# Patient Record
Sex: FEMALE | Race: WHITE | NOT HISPANIC OR LATINO | Employment: OTHER | URBAN - NONMETROPOLITAN AREA
[De-identification: names, ages, dates, MRNs, and addresses within clinical notes are randomized per-mention and may not be internally consistent; named-entity substitution may affect disease eponyms.]

---

## 2017-09-01 ENCOUNTER — APPOINTMENT (OUTPATIENT)
Dept: PREADMISSION TESTING | Facility: HOSPITAL | Age: 57
End: 2017-09-01

## 2017-09-01 VITALS — WEIGHT: 204 LBS | HEIGHT: 58 IN | BODY MASS INDEX: 42.82 KG/M2

## 2017-09-01 LAB
ALBUMIN SERPL-MCNC: 4.3 G/DL (ref 3.5–5)
ALBUMIN/GLOB SERPL: 1.6 G/DL (ref 1–2)
ALP SERPL-CCNC: 60 U/L (ref 38–126)
ALT SERPL W P-5'-P-CCNC: 38 U/L (ref 13–69)
ANION GAP SERPL CALCULATED.3IONS-SCNC: 13 MMOL/L
AST SERPL-CCNC: 22 U/L (ref 15–46)
BILIRUB SERPL-MCNC: 0.5 MG/DL (ref 0.2–1.3)
BUN BLD-MCNC: 10 MG/DL (ref 7–20)
BUN/CREAT SERPL: 11.1 (ref 7.1–23.5)
CALCIUM SPEC-SCNC: 9.5 MG/DL (ref 8.4–10.2)
CHLORIDE SERPL-SCNC: 107 MMOL/L (ref 98–107)
CO2 SERPL-SCNC: 26 MMOL/L (ref 26–30)
CREAT BLD-MCNC: 0.9 MG/DL (ref 0.6–1.3)
DEPRECATED RDW RBC AUTO: 46.5 FL (ref 37–54)
ERYTHROCYTE [DISTWIDTH] IN BLOOD BY AUTOMATED COUNT: 13.2 % (ref 11.5–14.5)
GFR SERPL CREATININE-BSD FRML MDRD: 65 ML/MIN/1.73
GLOBULIN UR ELPH-MCNC: 2.7 GM/DL
GLUCOSE BLD-MCNC: 130 MG/DL (ref 74–98)
HCT VFR BLD AUTO: 40 % (ref 37–47)
HGB BLD-MCNC: 12.7 G/DL (ref 12–16)
MCH RBC QN AUTO: 30.2 PG (ref 27–31)
MCHC RBC AUTO-ENTMCNC: 31.8 G/DL (ref 30–37)
MCV RBC AUTO: 95 FL (ref 81–99)
PLATELET # BLD AUTO: 216 10*3/MM3 (ref 130–400)
PMV BLD AUTO: 9.9 FL (ref 6–12)
POTASSIUM BLD-SCNC: 4 MMOL/L (ref 3.5–5.1)
PROT SERPL-MCNC: 7 G/DL (ref 6.3–8.2)
RBC # BLD AUTO: 4.21 10*6/MM3 (ref 4.2–5.4)
SODIUM BLD-SCNC: 142 MMOL/L (ref 137–145)
WBC NRBC COR # BLD: 5.17 10*3/MM3 (ref 4.8–10.8)

## 2017-09-01 PROCEDURE — 93005 ELECTROCARDIOGRAM TRACING: CPT | Performed by: ORTHOPAEDIC SURGERY

## 2017-09-01 PROCEDURE — 36415 COLL VENOUS BLD VENIPUNCTURE: CPT

## 2017-09-01 PROCEDURE — 85027 COMPLETE CBC AUTOMATED: CPT | Performed by: ORTHOPAEDIC SURGERY

## 2017-09-01 PROCEDURE — 80053 COMPREHEN METABOLIC PANEL: CPT | Performed by: ORTHOPAEDIC SURGERY

## 2017-09-01 RX ORDER — AMITRIPTYLINE HYDROCHLORIDE 50 MG/1
50 TABLET, FILM COATED ORAL NIGHTLY
COMMUNITY
End: 2021-11-04

## 2017-09-01 RX ORDER — GABAPENTIN 600 MG/1
600 TABLET ORAL 3 TIMES DAILY
COMMUNITY
End: 2021-11-04

## 2017-09-01 RX ORDER — LANOLIN ALCOHOL/MO/W.PET/CERES
1000 CREAM (GRAM) TOPICAL DAILY
COMMUNITY

## 2017-09-01 RX ORDER — TRAZODONE HYDROCHLORIDE 100 MG/1
100 TABLET ORAL NIGHTLY
COMMUNITY
End: 2022-03-17

## 2017-09-01 RX ORDER — OMEPRAZOLE 20 MG/1
20 CAPSULE, DELAYED RELEASE ORAL DAILY
COMMUNITY
End: 2022-03-17

## 2017-09-01 RX ORDER — LEVOTHYROXINE SODIUM 0.05 MG/1
50 TABLET ORAL DAILY
COMMUNITY

## 2017-09-01 RX ORDER — FLUOXETINE HYDROCHLORIDE 40 MG/1
40 CAPSULE ORAL DAILY
COMMUNITY
End: 2022-03-17

## 2017-09-01 RX ORDER — LISINOPRIL 40 MG/1
40 TABLET ORAL DAILY
COMMUNITY

## 2017-09-01 RX ORDER — MELOXICAM 15 MG/1
15 TABLET ORAL
COMMUNITY
End: 2021-11-04

## 2017-09-15 ENCOUNTER — HOSPITAL ENCOUNTER (OUTPATIENT)
Facility: HOSPITAL | Age: 57
Setting detail: HOSPITAL OUTPATIENT SURGERY
Discharge: HOME | End: 2017-09-15
Attending: ORTHOPAEDIC SURGERY | Admitting: ORTHOPAEDIC SURGERY

## 2017-09-15 ENCOUNTER — ANESTHESIA EVENT (OUTPATIENT)
Dept: PERIOP | Facility: HOSPITAL | Age: 57
End: 2017-09-15

## 2017-09-15 ENCOUNTER — ANESTHESIA (OUTPATIENT)
Dept: PERIOP | Facility: HOSPITAL | Age: 57
End: 2017-09-15

## 2017-09-15 VITALS
TEMPERATURE: 98 F | DIASTOLIC BLOOD PRESSURE: 97 MMHG | HEART RATE: 80 BPM | OXYGEN SATURATION: 97 % | RESPIRATION RATE: 18 BRPM | SYSTOLIC BLOOD PRESSURE: 165 MMHG

## 2017-09-15 PROCEDURE — 25010000002 SUCCINYLCHOLINE PER 20 MG: Performed by: NURSE ANESTHETIST, CERTIFIED REGISTERED

## 2017-09-15 PROCEDURE — 25010000002 MORPHINE PER 10 MG: Performed by: ORTHOPAEDIC SURGERY

## 2017-09-15 PROCEDURE — 25010000002 DEXAMETHASONE SODIUM PHOSPHATE 10 MG/ML SOLUTION: Performed by: NURSE ANESTHETIST, CERTIFIED REGISTERED

## 2017-09-15 PROCEDURE — 25010000002 DEXAMETHASONE PER 1 MG: Performed by: NURSE ANESTHETIST, CERTIFIED REGISTERED

## 2017-09-15 PROCEDURE — 25010000002 EPINEPHRINE 1 MG/ML SOLUTION: Performed by: ORTHOPAEDIC SURGERY

## 2017-09-15 PROCEDURE — 25010000002 ONDANSETRON PER 1 MG: Performed by: NURSE ANESTHETIST, CERTIFIED REGISTERED

## 2017-09-15 PROCEDURE — 25010000002 HYDROMORPHONE PER 4 MG

## 2017-09-15 RX ORDER — LIDOCAINE HYDROCHLORIDE 20 MG/ML
INJECTION, SOLUTION INFILTRATION; PERINEURAL
Status: DISCONTINUED
Start: 2017-09-15 | End: 2017-09-15 | Stop reason: HOSPADM

## 2017-09-15 RX ORDER — HYDROCODONE BITARTRATE AND ACETAMINOPHEN 7.5; 325 MG/1; MG/1
1-2 TABLET ORAL EVERY 4 HOURS PRN
Qty: 50 TABLET | Refills: 0 | Status: SHIPPED | OUTPATIENT
Start: 2017-09-15 | End: 2021-11-04

## 2017-09-15 RX ORDER — DEXAMETHASONE SODIUM PHOSPHATE 10 MG/ML
INJECTION, SOLUTION INTRAMUSCULAR; INTRAVENOUS
Status: DISCONTINUED
Start: 2017-09-15 | End: 2017-09-15 | Stop reason: HOSPADM

## 2017-09-15 RX ORDER — BUPIVACAINE HYDROCHLORIDE 2.5 MG/ML
INJECTION, SOLUTION EPIDURAL; INFILTRATION; INTRACAUDAL
Status: DISCONTINUED
Start: 2017-09-15 | End: 2017-09-15 | Stop reason: HOSPADM

## 2017-09-15 RX ORDER — DEXAMETHASONE SODIUM PHOSPHATE 10 MG/ML
INJECTION, SOLUTION INTRAMUSCULAR; INTRAVENOUS AS NEEDED
Status: DISCONTINUED | OUTPATIENT
Start: 2017-09-15 | End: 2017-09-15 | Stop reason: SURG

## 2017-09-15 RX ORDER — BUPIVACAINE HYDROCHLORIDE AND EPINEPHRINE 5; 5 MG/ML; UG/ML
INJECTION, SOLUTION EPIDURAL; INTRACAUDAL; PERINEURAL
Status: DISCONTINUED
Start: 2017-09-15 | End: 2017-09-15 | Stop reason: HOSPADM

## 2017-09-15 RX ORDER — ONDANSETRON 2 MG/ML
INJECTION INTRAMUSCULAR; INTRAVENOUS AS NEEDED
Status: DISCONTINUED | OUTPATIENT
Start: 2017-09-15 | End: 2017-09-15 | Stop reason: SURG

## 2017-09-15 RX ORDER — SUCCINYLCHOLINE CHLORIDE 20 MG/ML
INJECTION INTRAMUSCULAR; INTRAVENOUS AS NEEDED
Status: DISCONTINUED | OUTPATIENT
Start: 2017-09-15 | End: 2017-09-15 | Stop reason: SURG

## 2017-09-15 RX ORDER — BUPIVACAINE HYDROCHLORIDE 2.5 MG/ML
INJECTION, SOLUTION EPIDURAL; INFILTRATION; INTRACAUDAL AS NEEDED
Status: DISCONTINUED | OUTPATIENT
Start: 2017-09-15 | End: 2017-09-15 | Stop reason: SURG

## 2017-09-15 RX ORDER — ONDANSETRON 2 MG/ML
4 INJECTION INTRAMUSCULAR; INTRAVENOUS ONCE
Status: DISCONTINUED | OUTPATIENT
Start: 2017-09-15 | End: 2017-09-15 | Stop reason: HOSPADM

## 2017-09-15 RX ORDER — EPINEPHRINE 1 MG/ML
INJECTION INTRAMUSCULAR; INTRAVENOUS; SUBCUTANEOUS AS NEEDED
Status: DISCONTINUED | OUTPATIENT
Start: 2017-09-15 | End: 2017-09-15 | Stop reason: HOSPADM

## 2017-09-15 RX ORDER — DEXAMETHASONE SODIUM PHOSPHATE 4 MG/ML
INJECTION, SOLUTION INTRA-ARTICULAR; INTRALESIONAL; INTRAMUSCULAR; INTRAVENOUS; SOFT TISSUE AS NEEDED
Status: DISCONTINUED | OUTPATIENT
Start: 2017-09-15 | End: 2017-09-15 | Stop reason: SURG

## 2017-09-15 RX ORDER — SODIUM CHLORIDE 0.9 % (FLUSH) 0.9 %
3 SYRINGE (ML) INJECTION AS NEEDED
Status: DISCONTINUED | OUTPATIENT
Start: 2017-09-15 | End: 2017-09-15 | Stop reason: HOSPADM

## 2017-09-15 RX ORDER — SODIUM CHLORIDE, SODIUM LACTATE, POTASSIUM CHLORIDE, CALCIUM CHLORIDE 600; 310; 30; 20 MG/100ML; MG/100ML; MG/100ML; MG/100ML
1000 INJECTION, SOLUTION INTRAVENOUS CONTINUOUS PRN
Status: DISCONTINUED | OUTPATIENT
Start: 2017-09-15 | End: 2017-09-15 | Stop reason: HOSPADM

## 2017-09-15 RX ORDER — MEPERIDINE HYDROCHLORIDE 50 MG/ML
25 INJECTION INTRAMUSCULAR; INTRAVENOUS; SUBCUTANEOUS ONCE
Status: DISCONTINUED | OUTPATIENT
Start: 2017-09-15 | End: 2017-09-15 | Stop reason: HOSPADM

## 2017-09-15 RX ORDER — MEPERIDINE HYDROCHLORIDE 50 MG/ML
INJECTION INTRAMUSCULAR; INTRAVENOUS; SUBCUTANEOUS AS NEEDED
Status: DISCONTINUED | OUTPATIENT
Start: 2017-09-15 | End: 2017-09-15 | Stop reason: SURG

## 2017-09-15 RX ORDER — BUPIVACAINE HYDROCHLORIDE AND EPINEPHRINE 5; 5 MG/ML; UG/ML
INJECTION, SOLUTION EPIDURAL; INTRACAUDAL; PERINEURAL AS NEEDED
Status: DISCONTINUED | OUTPATIENT
Start: 2017-09-15 | End: 2017-09-15 | Stop reason: HOSPADM

## 2017-09-15 RX ADMIN — DEXAMETHASONE SODIUM PHOSPHATE 4 MG: 4 INJECTION, SOLUTION INTRAMUSCULAR; INTRAVENOUS at 09:35

## 2017-09-15 RX ADMIN — DEXAMETHASONE SODIUM PHOSPHATE 10 MG: 10 INJECTION, SOLUTION INTRAMUSCULAR; INTRAVENOUS at 10:43

## 2017-09-15 RX ADMIN — BUPIVACAINE HYDROCHLORIDE 15 ML: 2.5 INJECTION, SOLUTION EPIDURAL; INFILTRATION; INTRACAUDAL; PERINEURAL at 10:43

## 2017-09-15 RX ADMIN — ONDANSETRON 4 MG: 2 INJECTION INTRAMUSCULAR; INTRAVENOUS at 09:40

## 2017-09-15 RX ADMIN — MEPERIDINE HYDROCHLORIDE 25 MG: 50 INJECTION INTRAMUSCULAR; INTRAVENOUS; SUBCUTANEOUS at 09:28

## 2017-09-15 RX ADMIN — CEFAZOLIN 1 G: 1 INJECTION, POWDER, FOR SOLUTION INTRAMUSCULAR; INTRAVENOUS; PARENTERAL at 09:25

## 2017-09-15 RX ADMIN — HYDROMORPHONE HYDROCHLORIDE 1 MG: 1 INJECTION, SOLUTION INTRAMUSCULAR; INTRAVENOUS; SUBCUTANEOUS at 10:15

## 2017-09-15 RX ADMIN — SODIUM CHLORIDE, POTASSIUM CHLORIDE, SODIUM LACTATE AND CALCIUM CHLORIDE 500 ML: 600; 310; 30; 20 INJECTION, SOLUTION INTRAVENOUS at 07:55

## 2017-09-15 RX ADMIN — MEPERIDINE HYDROCHLORIDE 25 MG: 50 INJECTION INTRAMUSCULAR; INTRAVENOUS; SUBCUTANEOUS at 09:18

## 2017-09-15 RX ADMIN — SUCCINYLCHOLINE CHLORIDE 80 MG: 20 INJECTION, SOLUTION INTRAMUSCULAR; INTRAVENOUS at 09:22

## 2017-09-15 RX ADMIN — Medication 1 MG: at 10:15

## 2017-09-27 ENCOUNTER — HOSPITAL ENCOUNTER (EMERGENCY)
Facility: HOSPITAL | Age: 57
Discharge: HOME | End: 2017-09-27
Attending: EMERGENCY MEDICINE | Admitting: EMERGENCY MEDICINE

## 2017-09-27 VITALS
HEART RATE: 95 BPM | DIASTOLIC BLOOD PRESSURE: 84 MMHG | TEMPERATURE: 98.5 F | HEIGHT: 58 IN | RESPIRATION RATE: 16 BRPM | BODY MASS INDEX: 41.98 KG/M2 | SYSTOLIC BLOOD PRESSURE: 138 MMHG | WEIGHT: 200 LBS | OXYGEN SATURATION: 99 %

## 2017-09-27 DIAGNOSIS — H92.01 EAR DISCOMFORT, RIGHT: Primary | ICD-10-CM

## 2017-09-27 PROCEDURE — 99282 EMERGENCY DEPT VISIT SF MDM: CPT

## 2018-10-29 ENCOUNTER — TRANSCRIBE ORDERS (OUTPATIENT)
Dept: ADMINISTRATIVE | Facility: HOSPITAL | Age: 58
End: 2018-10-29

## 2018-10-29 DIAGNOSIS — Z12.39 SCREENING BREAST EXAMINATION: Primary | ICD-10-CM

## 2018-12-14 ENCOUNTER — HOSPITAL ENCOUNTER (OUTPATIENT)
Dept: MAMMOGRAPHY | Facility: HOSPITAL | Age: 58
Discharge: HOME | End: 2018-12-14

## 2018-12-14 DIAGNOSIS — Z12.39 SCREENING BREAST EXAMINATION: ICD-10-CM

## 2018-12-31 ENCOUNTER — HOSPITAL ENCOUNTER (OUTPATIENT)
Dept: MAMMOGRAPHY | Facility: HOSPITAL | Age: 58
Discharge: HOME | End: 2018-12-31
Admitting: FAMILY MEDICINE

## 2018-12-31 PROCEDURE — 77063 BREAST TOMOSYNTHESIS BI: CPT

## 2018-12-31 PROCEDURE — 77067 SCR MAMMO BI INCL CAD: CPT

## 2020-01-22 ENCOUNTER — HOSPITAL ENCOUNTER (OUTPATIENT)
Dept: ULTRASOUND IMAGING | Facility: HOSPITAL | Age: 60
Discharge: HOME | End: 2020-01-22
Admitting: ORTHOPAEDIC SURGERY

## 2020-01-22 ENCOUNTER — TRANSCRIBE ORDERS (OUTPATIENT)
Dept: ULTRASOUND IMAGING | Facility: HOSPITAL | Age: 60
End: 2020-01-22

## 2020-01-22 DIAGNOSIS — M79.605 LEFT LEG PAIN: Primary | ICD-10-CM

## 2020-01-22 PROCEDURE — 93971 EXTREMITY STUDY: CPT

## 2021-01-19 ENCOUNTER — TRANSCRIBE ORDERS (OUTPATIENT)
Dept: ADMINISTRATIVE | Facility: HOSPITAL | Age: 61
End: 2021-01-19

## 2021-01-19 DIAGNOSIS — Z12.31 VISIT FOR SCREENING MAMMOGRAM: Primary | ICD-10-CM

## 2021-05-06 ENCOUNTER — HOSPITAL ENCOUNTER (OUTPATIENT)
Dept: MAMMOGRAPHY | Facility: HOSPITAL | Age: 61
Discharge: HOME | End: 2021-05-06
Admitting: FAMILY MEDICINE

## 2021-05-06 DIAGNOSIS — Z12.31 VISIT FOR SCREENING MAMMOGRAM: ICD-10-CM

## 2021-05-06 PROCEDURE — 77063 BREAST TOMOSYNTHESIS BI: CPT

## 2021-05-06 PROCEDURE — 77067 SCR MAMMO BI INCL CAD: CPT

## 2021-11-04 ENCOUNTER — OFFICE VISIT (OUTPATIENT)
Dept: PULMONOLOGY | Facility: CLINIC | Age: 61
End: 2021-11-04

## 2021-11-04 VITALS
SYSTOLIC BLOOD PRESSURE: 134 MMHG | OXYGEN SATURATION: 98 % | DIASTOLIC BLOOD PRESSURE: 78 MMHG | HEART RATE: 69 BPM | WEIGHT: 208 LBS | HEIGHT: 58 IN | RESPIRATION RATE: 18 BRPM | BODY MASS INDEX: 43.66 KG/M2

## 2021-11-04 DIAGNOSIS — G47.33 OBSTRUCTIVE SLEEP APNEA: Primary | ICD-10-CM

## 2021-11-04 DIAGNOSIS — G47.19 EXCESSIVE DAYTIME SLEEPINESS: ICD-10-CM

## 2021-11-04 DIAGNOSIS — E66.01 MORBID OBESITY, UNSPECIFIED OBESITY TYPE (HCC): ICD-10-CM

## 2021-11-04 DIAGNOSIS — R06.83 SNORING: ICD-10-CM

## 2021-11-04 PROCEDURE — 99204 OFFICE O/P NEW MOD 45 MIN: CPT | Performed by: INTERNAL MEDICINE

## 2021-11-04 RX ORDER — TRIAMTERENE AND HYDROCHLOROTHIAZIDE 37.5; 25 MG/1; MG/1
1 CAPSULE ORAL EVERY MORNING
COMMUNITY

## 2021-11-04 RX ORDER — ATORVASTATIN CALCIUM 20 MG/1
20 TABLET, FILM COATED ORAL DAILY
COMMUNITY

## 2021-11-04 RX ORDER — GLIPIZIDE 10 MG/1
10 TABLET ORAL
COMMUNITY

## 2021-11-04 RX ORDER — METOPROLOL SUCCINATE 50 MG/1
50 TABLET, EXTENDED RELEASE ORAL DAILY
COMMUNITY

## 2021-11-04 RX ORDER — PANTOPRAZOLE SODIUM 20 MG/1
20 TABLET, DELAYED RELEASE ORAL DAILY
COMMUNITY

## 2021-11-04 RX ORDER — ASPIRIN 81 MG/1
81 TABLET ORAL DAILY
COMMUNITY

## 2021-11-04 RX ORDER — AMLODIPINE BESYLATE 10 MG/1
10 TABLET ORAL DAILY
COMMUNITY

## 2021-11-04 RX ORDER — MECLIZINE HCL 25MG 25 MG/1
25 TABLET, CHEWABLE ORAL 3 TIMES DAILY PRN
COMMUNITY

## 2022-03-17 ENCOUNTER — OFFICE VISIT (OUTPATIENT)
Dept: PULMONOLOGY | Facility: CLINIC | Age: 62
End: 2022-03-17

## 2022-03-17 VITALS
SYSTOLIC BLOOD PRESSURE: 128 MMHG | OXYGEN SATURATION: 99 % | HEART RATE: 69 BPM | DIASTOLIC BLOOD PRESSURE: 74 MMHG | HEIGHT: 58 IN | BODY MASS INDEX: 42.4 KG/M2 | WEIGHT: 202 LBS

## 2022-03-17 DIAGNOSIS — G47.33 OBSTRUCTIVE SLEEP APNEA: Primary | ICD-10-CM

## 2022-03-17 DIAGNOSIS — G47.19 EXCESSIVE DAYTIME SLEEPINESS: ICD-10-CM

## 2022-03-17 DIAGNOSIS — E66.01 MORBID OBESITY WITH BMI OF 40.0-44.9, ADULT: ICD-10-CM

## 2022-03-17 PROCEDURE — 99212 OFFICE O/P EST SF 10 MIN: CPT | Performed by: NURSE PRACTITIONER

## 2022-03-17 RX ORDER — EMPAGLIFLOZIN 10 MG/1
10 TABLET, FILM COATED ORAL EVERY MORNING
COMMUNITY
Start: 2021-12-08

## 2022-05-25 ENCOUNTER — TRANSCRIBE ORDERS (OUTPATIENT)
Dept: ADMINISTRATIVE | Facility: HOSPITAL | Age: 62
End: 2022-05-25

## 2022-05-25 DIAGNOSIS — E11.65 TYPE 2 DIABETES MELLITUS WITH HYPERGLYCEMIA, UNSPECIFIED WHETHER LONG TERM INSULIN USE: Primary | ICD-10-CM

## 2022-05-26 ENCOUNTER — TRANSCRIBE ORDERS (OUTPATIENT)
Dept: ADMINISTRATIVE | Facility: HOSPITAL | Age: 62
End: 2022-05-26

## 2022-05-26 DIAGNOSIS — Z12.31 ENCOUNTER FOR SCREENING MAMMOGRAM FOR MALIGNANT NEOPLASM OF BREAST: Primary | ICD-10-CM

## 2022-06-15 ENCOUNTER — HOSPITAL ENCOUNTER (OUTPATIENT)
Dept: MAMMOGRAPHY | Facility: HOSPITAL | Age: 62
Discharge: HOME | End: 2022-06-15
Admitting: FAMILY MEDICINE

## 2022-06-15 DIAGNOSIS — Z12.31 ENCOUNTER FOR SCREENING MAMMOGRAM FOR MALIGNANT NEOPLASM OF BREAST: ICD-10-CM

## 2022-06-15 PROCEDURE — 77063 BREAST TOMOSYNTHESIS BI: CPT | Performed by: RADIOLOGY

## 2022-06-15 PROCEDURE — 77067 SCR MAMMO BI INCL CAD: CPT

## 2022-06-15 PROCEDURE — 77067 SCR MAMMO BI INCL CAD: CPT | Performed by: RADIOLOGY

## 2022-06-15 PROCEDURE — 77063 BREAST TOMOSYNTHESIS BI: CPT

## 2022-08-11 ENCOUNTER — HOSPITAL ENCOUNTER (OUTPATIENT)
Dept: NUTRITION | Facility: HOSPITAL | Age: 62
Discharge: HOME | End: 2022-08-11
Admitting: FAMILY MEDICINE

## 2022-08-11 PROCEDURE — 97802 MEDICAL NUTRITION INDIV IN: CPT

## 2022-08-12 VITALS — WEIGHT: 206 LBS | HEIGHT: 58 IN | BODY MASS INDEX: 43.24 KG/M2

## 2022-10-06 ENCOUNTER — OFFICE VISIT (OUTPATIENT)
Dept: PULMONOLOGY | Facility: CLINIC | Age: 62
End: 2022-10-06

## 2022-10-06 VITALS
BODY MASS INDEX: 42.36 KG/M2 | HEART RATE: 72 BPM | WEIGHT: 201.8 LBS | DIASTOLIC BLOOD PRESSURE: 92 MMHG | SYSTOLIC BLOOD PRESSURE: 146 MMHG | HEIGHT: 58 IN | OXYGEN SATURATION: 100 %

## 2022-10-06 DIAGNOSIS — G47.33 OBSTRUCTIVE SLEEP APNEA: Primary | ICD-10-CM

## 2022-10-06 DIAGNOSIS — J44.9 CHRONIC OBSTRUCTIVE PULMONARY DISEASE, UNSPECIFIED COPD TYPE: ICD-10-CM

## 2022-10-06 DIAGNOSIS — E66.01 MORBID OBESITY WITH BMI OF 40.0-44.9, ADULT: ICD-10-CM

## 2022-10-06 PROCEDURE — 99214 OFFICE O/P EST MOD 30 MIN: CPT | Performed by: INTERNAL MEDICINE

## 2022-10-06 RX ORDER — PREGABALIN 75 MG/1
75 CAPSULE ORAL 2 TIMES DAILY
COMMUNITY
Start: 2022-08-23

## 2022-10-06 RX ORDER — INSULIN GLARGINE 100 [IU]/ML
INJECTION, SOLUTION SUBCUTANEOUS
COMMUNITY
Start: 2022-08-29

## 2022-10-06 RX ORDER — SPIRONOLACTONE AND HYDROCHLOROTHIAZIDE 25; 25 MG/1; MG/1
TABLET ORAL
COMMUNITY
Start: 2022-08-03

## 2023-03-22 ENCOUNTER — TELEMEDICINE (OUTPATIENT)
Dept: PULMONOLOGY | Facility: CLINIC | Age: 63
End: 2023-03-22
Payer: MEDICARE

## 2023-03-22 DIAGNOSIS — G47.33 OBSTRUCTIVE SLEEP APNEA: Primary | ICD-10-CM

## 2023-03-22 DIAGNOSIS — G47.19 EXCESSIVE DAYTIME SLEEPINESS: ICD-10-CM

## 2023-03-22 DIAGNOSIS — E66.01 MORBID OBESITY, UNSPECIFIED OBESITY TYPE: ICD-10-CM

## 2023-03-22 PROCEDURE — 99213 OFFICE O/P EST LOW 20 MIN: CPT | Performed by: NURSE PRACTITIONER

## 2023-06-09 ENCOUNTER — TRANSCRIBE ORDERS (OUTPATIENT)
Dept: ADMINISTRATIVE | Facility: HOSPITAL | Age: 63
End: 2023-06-09
Payer: MEDICARE

## 2023-06-09 DIAGNOSIS — Z12.31 ENCOUNTER FOR SCREENING MAMMOGRAM FOR MALIGNANT NEOPLASM OF BREAST: Primary | ICD-10-CM

## 2023-08-18 ENCOUNTER — OFFICE VISIT (OUTPATIENT)
Dept: PULMONOLOGY | Facility: CLINIC | Age: 63
End: 2023-08-18
Payer: MEDICARE

## 2023-08-18 VITALS
WEIGHT: 194 LBS | RESPIRATION RATE: 18 BRPM | OXYGEN SATURATION: 100 % | BODY MASS INDEX: 40.72 KG/M2 | SYSTOLIC BLOOD PRESSURE: 122 MMHG | DIASTOLIC BLOOD PRESSURE: 72 MMHG | HEART RATE: 68 BPM | HEIGHT: 58 IN

## 2023-08-18 DIAGNOSIS — G47.33 OBSTRUCTIVE SLEEP APNEA: Primary | ICD-10-CM

## 2023-08-18 DIAGNOSIS — E66.01 MORBID OBESITY WITH BMI OF 40.0-44.9, ADULT: ICD-10-CM

## 2023-08-18 PROCEDURE — 99212 OFFICE O/P EST SF 10 MIN: CPT | Performed by: NURSE PRACTITIONER

## 2023-09-23 ENCOUNTER — APPOINTMENT (OUTPATIENT)
Dept: GENERAL RADIOLOGY | Facility: HOSPITAL | Age: 63
End: 2023-09-23
Payer: MEDICARE

## 2023-09-23 ENCOUNTER — HOSPITAL ENCOUNTER (EMERGENCY)
Facility: HOSPITAL | Age: 63
Discharge: HOME | End: 2023-09-23
Attending: EMERGENCY MEDICINE
Payer: MEDICARE

## 2023-09-23 ENCOUNTER — APPOINTMENT (OUTPATIENT)
Dept: CT IMAGING | Facility: HOSPITAL | Age: 63
End: 2023-09-23
Payer: MEDICARE

## 2023-09-23 VITALS
SYSTOLIC BLOOD PRESSURE: 161 MMHG | HEART RATE: 63 BPM | DIASTOLIC BLOOD PRESSURE: 71 MMHG | RESPIRATION RATE: 17 BRPM | HEIGHT: 58 IN | BODY MASS INDEX: 40.3 KG/M2 | OXYGEN SATURATION: 97 % | TEMPERATURE: 97.5 F | WEIGHT: 192 LBS

## 2023-09-23 DIAGNOSIS — R07.9 CHEST PAIN, UNSPECIFIED TYPE: ICD-10-CM

## 2023-09-23 DIAGNOSIS — R42 DIZZINESS: ICD-10-CM

## 2023-09-23 DIAGNOSIS — R11.2 NAUSEA AND VOMITING, UNSPECIFIED VOMITING TYPE: Primary | ICD-10-CM

## 2023-09-23 DIAGNOSIS — R73.9 HYPERGLYCEMIA: ICD-10-CM

## 2023-09-23 LAB
ALBUMIN SERPL-MCNC: 4.2 G/DL (ref 3.5–5.2)
ALBUMIN/GLOB SERPL: 1.8 G/DL
ALP SERPL-CCNC: 85 U/L (ref 39–117)
ALT SERPL W P-5'-P-CCNC: 12 U/L (ref 1–33)
ANION GAP SERPL CALCULATED.3IONS-SCNC: 12.3 MMOL/L (ref 5–15)
AST SERPL-CCNC: 12 U/L (ref 1–32)
ATMOSPHERIC PRESS: 735 MMHG
BASE EXCESS BLDV CALC-SCNC: -0.9 MMOL/L (ref 0–2)
BASOPHILS # BLD AUTO: 0.04 10*3/MM3 (ref 0–0.2)
BASOPHILS NFR BLD AUTO: 0.5 % (ref 0–1.5)
BDY SITE: ABNORMAL
BILIRUB SERPL-MCNC: 0.3 MG/DL (ref 0–1.2)
BILIRUB UR QL STRIP: NEGATIVE
BUN SERPL-MCNC: 18 MG/DL (ref 8–23)
BUN/CREAT SERPL: 22.8 (ref 7–25)
CALCIUM SPEC-SCNC: 9.5 MG/DL (ref 8.6–10.5)
CHLORIDE SERPL-SCNC: 104 MMOL/L (ref 98–107)
CLARITY UR: ABNORMAL
CO2 SERPL-SCNC: 22.7 MMOL/L (ref 22–29)
COHGB MFR BLD: 1.4 % (ref 0–5)
COLOR UR: YELLOW
CREAT SERPL-MCNC: 0.79 MG/DL (ref 0.57–1)
DEPRECATED RDW RBC AUTO: 42.8 FL (ref 37–54)
EGFRCR SERPLBLD CKD-EPI 2021: 84.7 ML/MIN/1.73
EOSINOPHIL # BLD AUTO: 0.06 10*3/MM3 (ref 0–0.4)
EOSINOPHIL NFR BLD AUTO: 0.7 % (ref 0.3–6.2)
ERYTHROCYTE [DISTWIDTH] IN BLOOD BY AUTOMATED COUNT: 13.1 % (ref 12.3–15.4)
FLUAV RNA RESP QL NAA+PROBE: NOT DETECTED
FLUBV RNA RESP QL NAA+PROBE: NOT DETECTED
GEN 5 2HR TROPONIN T REFLEX: <6 NG/L
GLOBULIN UR ELPH-MCNC: 2.4 GM/DL
GLUCOSE BLDC GLUCOMTR-MCNC: 344 MG/DL (ref 70–130)
GLUCOSE SERPL-MCNC: 397 MG/DL (ref 65–99)
GLUCOSE UR STRIP-MCNC: ABNORMAL MG/DL
HCO3 BLDV-SCNC: 25.7 MMOL/L (ref 22–28)
HCT VFR BLD AUTO: 40.6 % (ref 34–46.6)
HGB BLD-MCNC: 13.5 G/DL (ref 12–15.9)
HGB UR QL STRIP.AUTO: NEGATIVE
IMM GRANULOCYTES # BLD AUTO: 0.04 10*3/MM3 (ref 0–0.05)
IMM GRANULOCYTES NFR BLD AUTO: 0.5 % (ref 0–0.5)
KETONES UR QL STRIP: NEGATIVE
LEUKOCYTE ESTERASE UR QL STRIP.AUTO: NEGATIVE
LIPASE SERPL-CCNC: 51 U/L (ref 13–60)
LYMPHOCYTES # BLD AUTO: 2.56 10*3/MM3 (ref 0.7–3.1)
LYMPHOCYTES NFR BLD AUTO: 31.8 % (ref 19.6–45.3)
Lab: ABNORMAL
MCH RBC QN AUTO: 29.7 PG (ref 26.6–33)
MCHC RBC AUTO-ENTMCNC: 33.3 G/DL (ref 31.5–35.7)
MCV RBC AUTO: 89.2 FL (ref 79–97)
METHGB BLD QL: 0.6 % (ref 0–3)
MODALITY: ABNORMAL
MONOCYTES # BLD AUTO: 0.43 10*3/MM3 (ref 0.1–0.9)
MONOCYTES NFR BLD AUTO: 5.3 % (ref 5–12)
NEUTROPHILS NFR BLD AUTO: 4.93 10*3/MM3 (ref 1.7–7)
NEUTROPHILS NFR BLD AUTO: 61.2 % (ref 42.7–76)
NITRITE UR QL STRIP: NEGATIVE
NRBC BLD AUTO-RTO: 0 /100 WBC (ref 0–0.2)
OXYHGB MFR BLDV: 71.3 % (ref 40–70)
PCO2 BLDV: 49.3 MM HG (ref 40–50)
PH BLDV: 7.33 PH UNITS (ref 7.32–7.42)
PH UR STRIP.AUTO: 8 [PH] (ref 5–8)
PLATELET # BLD AUTO: 245 10*3/MM3 (ref 140–450)
PMV BLD AUTO: 10.8 FL (ref 6–12)
PO2 BLDV: 42 MM HG (ref 30–50)
POTASSIUM SERPL-SCNC: 4.3 MMOL/L (ref 3.5–5.2)
PROT SERPL-MCNC: 6.6 G/DL (ref 6–8.5)
PROT UR QL STRIP: NEGATIVE
RBC # BLD AUTO: 4.55 10*6/MM3 (ref 3.77–5.28)
SAO2 % BLDCOV: 72.8 % (ref 45–75)
SARS-COV-2 RNA RESP QL NAA+PROBE: NOT DETECTED
SODIUM SERPL-SCNC: 139 MMOL/L (ref 136–145)
SP GR UR STRIP: 1.02 (ref 1–1.03)
TROPONIN T DELTA: NORMAL
TROPONIN T SERPL HS-MCNC: 6 NG/L
UROBILINOGEN UR QL STRIP: ABNORMAL
VENTILATOR MODE: ABNORMAL
WBC NRBC COR # BLD: 8.06 10*3/MM3 (ref 3.4–10.8)

## 2023-09-23 PROCEDURE — 83690 ASSAY OF LIPASE: CPT

## 2023-09-23 PROCEDURE — 93005 ELECTROCARDIOGRAM TRACING: CPT

## 2023-09-23 PROCEDURE — 85025 COMPLETE CBC W/AUTO DIFF WBC: CPT

## 2023-09-23 PROCEDURE — 25010000002 ONDANSETRON PER 1 MG

## 2023-09-23 PROCEDURE — 81003 URINALYSIS AUTO W/O SCOPE: CPT

## 2023-09-23 PROCEDURE — 96361 HYDRATE IV INFUSION ADD-ON: CPT

## 2023-09-23 PROCEDURE — 80053 COMPREHEN METABOLIC PANEL: CPT

## 2023-09-23 PROCEDURE — 84484 ASSAY OF TROPONIN QUANT: CPT

## 2023-09-23 PROCEDURE — 25010000002 METOCLOPRAMIDE PER 10 MG

## 2023-09-23 PROCEDURE — 87636 SARSCOV2 & INF A&B AMP PRB: CPT

## 2023-09-23 PROCEDURE — 82820 HEMOGLOBIN-OXYGEN AFFINITY: CPT

## 2023-09-23 PROCEDURE — 71045 X-RAY EXAM CHEST 1 VIEW: CPT

## 2023-09-23 PROCEDURE — 82805 BLOOD GASES W/O2 SATURATION: CPT

## 2023-09-23 PROCEDURE — 36415 COLL VENOUS BLD VENIPUNCTURE: CPT

## 2023-09-23 PROCEDURE — 25010000002 DIPHENHYDRAMINE PER 50 MG

## 2023-09-23 PROCEDURE — 99284 EMERGENCY DEPT VISIT MOD MDM: CPT

## 2023-09-23 PROCEDURE — 25010000002 MORPHINE PER 10 MG: Performed by: EMERGENCY MEDICINE

## 2023-09-23 PROCEDURE — 82948 REAGENT STRIP/BLOOD GLUCOSE: CPT

## 2023-09-23 PROCEDURE — 96374 THER/PROPH/DIAG INJ IV PUSH: CPT

## 2023-09-23 PROCEDURE — 70450 CT HEAD/BRAIN W/O DYE: CPT

## 2023-09-23 PROCEDURE — 96375 TX/PRO/DX INJ NEW DRUG ADDON: CPT

## 2023-09-23 RX ORDER — MECLIZINE HYDROCHLORIDE 25 MG/1
50 TABLET ORAL 3 TIMES DAILY PRN
Qty: 30 TABLET | Refills: 0 | Status: SHIPPED | OUTPATIENT
Start: 2023-09-23

## 2023-09-23 RX ORDER — DIPHENHYDRAMINE HYDROCHLORIDE 50 MG/ML
25 INJECTION INTRAMUSCULAR; INTRAVENOUS ONCE
Status: COMPLETED | OUTPATIENT
Start: 2023-09-23 | End: 2023-09-23

## 2023-09-23 RX ORDER — ONDANSETRON 4 MG/1
4 TABLET, ORALLY DISINTEGRATING ORAL EVERY 8 HOURS PRN
Qty: 12 TABLET | Refills: 0 | Status: SHIPPED | OUTPATIENT
Start: 2023-09-23

## 2023-09-23 RX ORDER — METOCLOPRAMIDE HYDROCHLORIDE 5 MG/ML
5 INJECTION INTRAMUSCULAR; INTRAVENOUS ONCE
Status: COMPLETED | OUTPATIENT
Start: 2023-09-23 | End: 2023-09-23

## 2023-09-23 RX ORDER — ONDANSETRON 2 MG/ML
4 INJECTION INTRAMUSCULAR; INTRAVENOUS ONCE
Status: COMPLETED | OUTPATIENT
Start: 2023-09-23 | End: 2023-09-23

## 2023-09-23 RX ORDER — MECLIZINE HYDROCHLORIDE 25 MG/1
50 TABLET ORAL ONCE
Status: COMPLETED | OUTPATIENT
Start: 2023-09-23 | End: 2023-09-23

## 2023-09-23 RX ADMIN — DIPHENHYDRAMINE HYDROCHLORIDE 25 MG: 50 INJECTION INTRAMUSCULAR; INTRAVENOUS at 10:31

## 2023-09-23 RX ADMIN — ONDANSETRON 4 MG: 2 INJECTION INTRAMUSCULAR; INTRAVENOUS at 09:19

## 2023-09-23 RX ADMIN — SODIUM CHLORIDE 1000 ML: 9 INJECTION, SOLUTION INTRAVENOUS at 09:21

## 2023-09-23 RX ADMIN — MORPHINE SULFATE 4 MG: 4 INJECTION, SOLUTION INTRAMUSCULAR; INTRAVENOUS at 09:19

## 2023-09-23 RX ADMIN — MECLIZINE HYDROCHLORIDE 50 MG: 25 TABLET ORAL at 10:32

## 2023-09-23 RX ADMIN — METOCLOPRAMIDE 5 MG: 5 INJECTION, SOLUTION INTRAMUSCULAR; INTRAVENOUS at 10:32

## 2024-03-13 ENCOUNTER — TRANSCRIBE ORDERS (OUTPATIENT)
Dept: ADMINISTRATIVE | Facility: HOSPITAL | Age: 64
End: 2024-03-13
Payer: MEDICARE

## 2024-03-13 DIAGNOSIS — Z12.31 ENCOUNTER FOR SCREENING MAMMOGRAM FOR MALIGNANT NEOPLASM OF BREAST: Primary | ICD-10-CM

## 2024-03-18 ENCOUNTER — OFFICE VISIT (OUTPATIENT)
Dept: PULMONOLOGY | Facility: CLINIC | Age: 64
End: 2024-03-18
Payer: MEDICARE

## 2024-03-18 VITALS
DIASTOLIC BLOOD PRESSURE: 72 MMHG | HEART RATE: 55 BPM | RESPIRATION RATE: 18 BRPM | WEIGHT: 195.8 LBS | HEIGHT: 58 IN | SYSTOLIC BLOOD PRESSURE: 120 MMHG | OXYGEN SATURATION: 99 % | BODY MASS INDEX: 41.1 KG/M2

## 2024-03-18 DIAGNOSIS — E66.01 MORBID OBESITY, UNSPECIFIED OBESITY TYPE: ICD-10-CM

## 2024-03-18 DIAGNOSIS — G47.33 OBSTRUCTIVE SLEEP APNEA: Primary | ICD-10-CM

## 2024-03-18 PROCEDURE — 99213 OFFICE O/P EST LOW 20 MIN: CPT | Performed by: INTERNAL MEDICINE

## 2024-03-18 RX ORDER — TOPIRAMATE 25 MG/1
25 CAPSULE ORAL 2 TIMES DAILY
COMMUNITY

## 2024-05-31 ENCOUNTER — HOSPITAL ENCOUNTER (OUTPATIENT)
Dept: MAMMOGRAPHY | Facility: HOSPITAL | Age: 64
Discharge: HOME | End: 2024-05-31
Admitting: FAMILY MEDICINE
Payer: MEDICARE

## 2024-05-31 DIAGNOSIS — Z12.31 ENCOUNTER FOR SCREENING MAMMOGRAM FOR MALIGNANT NEOPLASM OF BREAST: ICD-10-CM

## 2024-05-31 PROCEDURE — 77067 SCR MAMMO BI INCL CAD: CPT

## 2024-05-31 PROCEDURE — 77063 BREAST TOMOSYNTHESIS BI: CPT

## 2024-06-07 ENCOUNTER — HOSPITAL ENCOUNTER (OUTPATIENT)
Facility: HOSPITAL | Age: 64
Discharge: HOME | End: 2024-06-07
Payer: MEDICARE

## 2024-06-07 DIAGNOSIS — R92.8 ABNORMAL MAMMOGRAM: ICD-10-CM

## 2024-06-07 PROCEDURE — G0279 TOMOSYNTHESIS, MAMMO: HCPCS

## 2024-06-07 PROCEDURE — 77065 DX MAMMO INCL CAD UNI: CPT

## 2024-06-07 PROCEDURE — 76642 ULTRASOUND BREAST LIMITED: CPT

## 2024-12-17 ENCOUNTER — OFFICE VISIT (OUTPATIENT)
Dept: NEUROLOGY | Facility: CLINIC | Age: 64
End: 2024-12-17
Payer: MEDICARE

## 2024-12-17 VITALS
WEIGHT: 180 LBS | OXYGEN SATURATION: 100 % | BODY MASS INDEX: 37.79 KG/M2 | HEIGHT: 58 IN | SYSTOLIC BLOOD PRESSURE: 130 MMHG | HEART RATE: 63 BPM | TEMPERATURE: 96.9 F | DIASTOLIC BLOOD PRESSURE: 80 MMHG

## 2024-12-17 DIAGNOSIS — R51.9 PERSISTENT HEADACHES: ICD-10-CM

## 2024-12-17 DIAGNOSIS — G43.009 MIGRAINE WITHOUT AURA AND WITHOUT STATUS MIGRAINOSUS, NOT INTRACTABLE: Primary | ICD-10-CM

## 2024-12-17 RX ORDER — TIRZEPATIDE 7.5 MG/.5ML
INJECTION, SOLUTION SUBCUTANEOUS
COMMUNITY
Start: 2024-11-12

## 2024-12-17 RX ORDER — ERGOCALCIFEROL 1.25 MG/1
1 CAPSULE, LIQUID FILLED ORAL WEEKLY
COMMUNITY
Start: 2024-10-14

## 2024-12-17 RX ORDER — TOPIRAMATE 25 MG/1
25 TABLET, FILM COATED ORAL 2 TIMES DAILY
Qty: 60 TABLET | Refills: 2 | Status: SHIPPED | OUTPATIENT
Start: 2024-12-17

## 2024-12-17 RX ORDER — GLIPIZIDE 10 MG/1
1 TABLET ORAL DAILY
COMMUNITY
End: 2024-12-17

## 2024-12-17 NOTE — PROGRESS NOTES
"     New Patient Office Visit      Patient Name: Lizette Chong  : 1960   MRN: [id number]     Referring Physician: Argelia Lozano MD    Chief Complaint:    Chief Complaint   Patient presents with    Establish Care     Migraines; reports 30/30 headache days; reports 4 severe headaches; patient had partial relief of symptoms with Imitrex; also reports relief of symptoms (usually) with Ubrelvy; failed treatment with Topamax, Amitriptyline and is currently taking Metoprolol; last eye exam        History of Present Illness: Lizette Chong is a 64 y.o. female who is here today to establish care with Neurology.  She reports daily headaches to the left side of the head \"pressure\" and says at times  it will worsen to where she has blurred  vision, with photophobia and phonophobia and become a migraine. Reports she is having 3 MD's per month. She says the Topiramate was helping with these but she has been out since April. Reports she was at that time seeing Neuro at  [facility].   She has had headaches since she was 19 and says she had migraines as well.   She is under a lot of stress  with her mom who has dementia and she is caring for teenagers and her 35 yr old nephew lives with her and she is caring for all of them. She says this has been playing a toll on her and she  has been having some memory issues with this.    -CT Head WO on 2023  FINDINGS:There is no evidence of acute hemorrhage. No evidence of mass  effect or midline shift. There is no evidence of displaced fracture.  Mild cerebral atrophy noted.. Paranasal sinuses and mastoid air cells  are clear. Consider further assessment with MRI if clinically warranted.     IMPRESSION:  No evidence of acute hemorrhage, mass effect, or midline  shift.      Pertinent Medical History: KEVIN-wears CPAP religiously ( Followed by Dr Rosales), DM, diabetic peripheral neuropathy, RLS, Vit B and Vit D deficiency    Subjective      Review of Systems: "   Review of Systems   Eyes:  Positive for photophobia.   Neurological:  Positive for headache and memory problem.       Past Medical History:   Past Medical History:   Diagnosis Date    Anxiety     Arthritis     B12 deficiency     CTS (carpal tunnel syndrome)     Diabetes mellitus About 2015    Difficulty walking About 5 years ago    I wobble and my feet hurt all the time    Disease of thyroid gland     GERD (gastroesophageal reflux disease)     Headache, tension-type     Hiatal hernia     HL (hearing loss)     Hypertension     Migraine Age 19    Neuropathy     BOTH FEET    Neuropathy in diabetes 2014    Pre-diabetes     RLS (restless legs syndrome)     Sleep apnea with use of continuous positive airway pressure (CPAP)     Wears glasses        Past Surgical History:   Past Surgical History:   Procedure Laterality Date    BREAST BIOPSY Bilateral     Pt states needle biopsies bilaterally - unsure of dates    CHOLECYSTECTOMY      COLONOSCOPY  2012    ELBOW ARTHROSCOPY Right     ENDOSCOPY  2012    HYSTERECTOMY  2001    Total    KNEE ARTHROSCOPY Left 09/15/2017    Procedure: KNEE ARTHROSCOPY LEFT WITH PARTIAL MEDIAL MENISECTOMY;  Surgeon: Mike Beasley MD;  Location: [facility];  Service:     KNEE SURGERY Left     PINS PLACED TO STRAIGHTEN PATELLA    ROTATOR CUFF REPAIR Right        Family History:   Family History   Problem Relation Age of Onset    Breast cancer Maternal Grandmother     Migraines Maternal Grandmother     Breast cancer Maternal Aunt     Breast cancer Cousin     Dementia Mother         Mom was diagnosed with Alzheimers in 2013    Dementia Father         Before my dad passed he had sundowners dementia    Migraines Father     Seizures Father     Stroke Father         Dad had a TIA after his triple bypass    Stroke Sister     Ovarian cancer Neg Hx        Social History:   Social History     Socioeconomic History    Marital status: Single   Tobacco Use    Smoking status: Never    Smokeless tobacco: Never    Vaping Use    Vaping status: Never Used   Substance and Sexual Activity    Alcohol use: No    Drug use: Never    Sexual activity: Never       Medications:     Current Outpatient Medications:     aspirin 81 MG EC tablet, Take 1 tablet by mouth Daily., Disp: , Rfl:     Continuous Glucose  (FreeStyle Antonio 2 [place]) device, See Admin Instructions., Disp: , Rfl:     Continuous Glucose Sensor (FreeStyle Antonio 2 Sensor) [facility], CHANGE EVERY 14 DAYS., Disp: , Rfl:     empagliflozin (Jardiance) 10 MG tablet tablet, Take 1 tablet by mouth Every Morning., Disp: , Rfl:     glipizide (GLUCOTROL) 10 MG tablet, Take 1 tablet by mouth 2 (Two) Times a Day Before Meals., Disp: , Rfl:     Jardiance 10 MG tablet tablet, Take 1 tablet by mouth Every Morning., Disp: , Rfl:     Lantus SoloStar 100 UNIT/ML injection pen, INJECT 15 UNITS SUBCUTANEOUSLY ONCE DAILY AT BEDTIME, Disp: , Rfl:     lisinopril (PRINIVIL,ZESTRIL) 40 MG tablet, Take 1 tablet by mouth Daily., Disp: , Rfl:     meclizine (ANTIVERT) 25 MG tablet, Take 2 tablets by mouth 3 (Three) Times a Day As Needed for Dizziness or Nausea., Disp: 30 tablet, Rfl: 0    metoprolol succinate XL (TOPROL-XL) 50 MG 24 hr tablet, Take 1 tablet by mouth Daily., Disp: , Rfl:     Mounjaro 7.5 MG/0.5ML solution auto-injector, INJECT 7.5 MG SUBCUTANEOUSLY ONCE A WEEK, Disp: , Rfl:     naproxen (NAPROSYN) 375 MG tablet, TAKE 1 TABLET BY MOUTH TWICE DAILY WITH BREAKFAST AND WITH DINNER, Disp: , Rfl:     ondansetron ODT (ZOFRAN-ODT) 4 MG disintegrating tablet, Place 1 tablet on the tongue Every 8 (Eight) Hours As Needed for Nausea or Vomiting., Disp: 12 tablet, Rfl: 0    pantoprazole (PROTONIX) 20 MG EC tablet, Take 1 tablet by mouth Daily., Disp: , Rfl:     pregabalin (LYRICA) 75 MG capsule, Take 1 capsule by mouth 2 (Two) Times a Day., Disp: , Rfl:     SITagliptin (JANUVIA) 100 MG tablet, Take 1 tablet by mouth Daily., Disp: , Rfl:     ubrogepant (Ubrelvy) 100 MG tablet, TAKE ONE TABLET BY  "MOUTH AT ONSET OF MIGRAINE. IF SYMPTOMS PERSIST, A SECOND DOSE MAY BE TAKEN IN 2 HOURS. DO NOT EXCEED 2 DOSES IN A 24 HOUR PERIOD, UNLESS OTHERWISE INSTRUCTED BY YOUR PHYSICIAN, Disp: , Rfl:     vitamin B-12 (CYANOCOBALAMIN) 1000 MCG tablet, Take 1 tablet by mouth Daily., Disp: , Rfl:     vitamin D (ERGOCALCIFEROL) 1.25 MG (01512 UT) capsule capsule, Take 1 capsule by mouth 1 (One) Time Per Week., Disp: , Rfl:     topiramate (Topamax) 25 MG tablet, Take 1 tablet by mouth 2 (Two) Times a Day., Disp: 60 tablet, Rfl: 2    Allergies:   Allergies   Allergen Reactions    Nabumetone Hives    Phenobarbital-Belladonna Alk Other (See Comments)     HEADACHES         Objective     Physical Exam:  Vital Signs:   Vitals:    12/17/24 0905   BP: 130/80   Pulse: 63   Temp: 96.9 °F (36.1 °C)   SpO2: 100%   Weight: 81.6 kg (180 lb)   Height: 147.3 cm (58\")   PainSc: 7  Comment: back and head     Body mass index is 37.62 kg/m².     Physical Exam  Constitutional:       Appearance: Normal appearance.   HENT:      Head: Normocephalic.   Eyes:      General: Lids are normal.      Extraocular Movements: Extraocular movements intact.      Conjunctiva/sclera: Conjunctivae normal.   Pulmonary:      Effort: Pulmonary effort is normal.   Musculoskeletal:         General: Normal range of motion.   Skin:     General: Skin is warm and dry.   Neurological:      General: No focal deficit present.      Mental Status: She is alert and oriented to person, place, and time.      Cranial Nerves: Cranial nerves 2-12 are intact. No dysarthria.      Motor: Motor strength is normal.Motor function is intact. No tremor or pronator drift.      Coordination: Finger-Nose-Finger Test normal.   Psychiatric:         Attention and Perception: Attention normal.         Mood and Affect: Mood and affect normal.         Speech: Speech normal.         Behavior: Behavior normal. Behavior is cooperative.         Thought Content: Thought content normal.         Cognition and " Memory: Cognition normal.         Judgment: Judgment normal.         Neurological Exam  Mental Status  Alert. Oriented to person, place, time and situation. Oriented to person, place, and time. Speech is normal. no dysarthria present. Language is fluent with no aphasia. Attention and concentration are normal.    Cranial Nerves  CN II: Visual fields full to confrontation.  CN III, IV, VI: Extraocular movements intact bilaterally. Normal lids and orbits bilaterally.  CN V: Facial sensation is normal.  CN VII: Full and symmetric facial movement.  CN IX, X: Palate elevates symmetrically  CN XI: Shoulder shrug strength is normal.  CN XII: Tongue midline without atrophy or fasciculations.    Motor  Normal muscle bulk throughout. No fasciculations present. Normal muscle tone. No abnormal involuntary movements. Strength is 5/5 throughout all four extremities.    Coordination  No tremorRight: Finger-to-nose normal.    Gait  Casual gait is normal including stance, stride, and arm swing. Unable to rise from chair without using arms.      Assessment / Plan      Assessment/Plan:   Diagnoses and all orders for this visit:    1. Migraine without aura and without status migrainosus, not intractable (Primary)  -     topiramate (Topamax) 25 MG tablet; Take 1 tablet by mouth 2 (Two) Times a Day.  Dispense: 60 tablet; Refill: 2    2. Persistent headaches  -     topiramate (Topamax) 25 MG tablet; Take 1 tablet by mouth 2 (Two) Times a Day.  Dispense: 60 tablet; Refill: 2       This is a pleasant 64-year-old female patient here to establish care for headaches and migraines.  She has been having daily headaches for quite some time now reports that she was seeing [facility] neurology and at that time was on topiramate 25 mg daily and says it helped with the daily headaches as well as her migraines.  She has not had this since April and feels that it has increased her migraines in frequency and intensity as well as daily headaches  have returned.  Will start patient back on topiramate 25 mg daily and then in 2 weeks she may increase to twice daily dosing.  Indications and side effects discussed with patient she verbalizes understanding.  Patient would like to consider memory testing so we will follow-up in 3 months on her headaches and will incorporate memory testing at that time as discussed with patient.  Patient will call in the interim if she has any questions or concerns or changes.  Labs were reviewed on 11/15/2024.  Including lipids, CMP, A1c was 7.4 and vitamin D level was 20 and she is on weekly vitamin D .  This note was dictated using Dragon voice recognition software.   Follow Up:   Return in about 3 months (around 3/17/2025).    MARCIN Davis, FNP-[facility]

## 2024-12-24 ENCOUNTER — PATIENT ROUNDING (BHMG ONLY) (OUTPATIENT)
Dept: NEUROLOGY | Facility: CLINIC | Age: 64
End: 2024-12-24
Payer: MEDICARE

## 2025-03-04 ENCOUNTER — TRANSCRIBE ORDERS (OUTPATIENT)
Age: 65
End: 2025-03-04
Payer: MEDICARE

## 2025-03-04 DIAGNOSIS — I25.700 ATHEROSCLEROSIS OF CABG W UNSTABLE ANGINA PECTORIS: Primary | ICD-10-CM

## 2025-03-07 NOTE — PROGRESS NOTES
Cardiology New Patient Note     Name: Lizette Chong  :   1960  PCP: Agnes Contreras, APRN  Date:   03/10/2025  Department: [facility] [place] [facility] CARDIOLOGY  [address]  [place] [facility] [place] [address]  Fax [phone]  Phone [phone]    Chief Complaint   Patient presents with    Hypertension    Diabetes    Hyperlipidemia     Subjective     History of Present Illness  Lizette Chong is a 64 y.o. female who presents today as a new patient.  Per chart review, patient previously seen Dr. Astorga in .  Past medical history of COPD, sleep apnea, hypertension, hyperlipidemia and diabetes type 2.  The patient has history of abnormal nuclear Cardiolite study dated 2023 revealing fixed apical defect and fixed inferior defect possibly diaphragmatic attenuation no reversible ischemia seen.  The patient states that she has been having symptoms of chest tightness and heaviness in the middle of the chest, radiating times to her left arm, happens at night, comes with also exertion, associated with shortness of breath.  The symptoms are at rest, progressively getting worse, patient states that the symptoms can last up to an hour.  The patient is limited with exercise due to bad knees.    Past Medical History:   Diagnosis Date    Anxiety     Arthritis     B12 deficiency     CTS (carpal tunnel syndrome)     Diabetes mellitus About     Difficulty walking About 5 years ago    I wobble and my feet hurt all the time    Disease of thyroid gland     GERD (gastroesophageal reflux disease)     Headache, tension-type     Hiatal hernia     HL (hearing loss)     Hypertension     Migraine Age 19    Neuropathy     BOTH FEET    Neuropathy in diabetes     Pre-diabetes     RLS (restless legs syndrome)     Sleep apnea with use of continuous positive airway pressure (CPAP)     Wears glasses       Past Surgical History:   Procedure Laterality Date    BREAST BIOPSY  Bilateral     Pt states needle biopsies bilaterally - unsure of dates    CHOLECYSTECTOMY      COLONOSCOPY  2012    ELBOW ARTHROSCOPY Right     ENDOSCOPY  2012    HYSTERECTOMY  2001    Total    KNEE ARTHROSCOPY Left 09/15/2017    Procedure: KNEE ARTHROSCOPY LEFT WITH PARTIAL MEDIAL MENISECTOMY;  Surgeon: Mike Beasley MD;  Location: [facility];  Service:     KNEE SURGERY Left     PINS PLACED TO STRAIGHTEN PATELLA    ROTATOR CUFF REPAIR Right      Family History   Problem Relation Age of Onset    Breast cancer Maternal Grandmother     Migraines Maternal Grandmother     Breast cancer Maternal Aunt     Breast cancer Cousin     Dementia Mother         Mom was diagnosed with Alzheimers in 2013    Dementia Father         Before my dad passed he had sundowners dementia    Migraines Father     Seizures Father     Stroke Father         Dad had a TIA after his triple bypass    Stroke Sister     Ovarian cancer Neg Hx      Social History     Socioeconomic History    Marital status: Single   Tobacco Use    Smoking status: Never    Smokeless tobacco: Never   Vaping Use    Vaping status: Never Used   Substance and Sexual Activity    Alcohol use: No    Drug use: Never    Sexual activity: Never     Allergies   Allergen Reactions    Nabumetone Hives    Phenobarbital-Belladonna Alk Other (See Comments)     HEADACHES PT NOT SURE         Current Outpatient Medications:     aspirin 81 MG EC tablet, Take 1 tablet by mouth Daily., Disp: , Rfl:     Continuous Glucose  (FreeStyle Antonio 2 [place]) device, See Admin Instructions., Disp: , Rfl:     Continuous Glucose Sensor (FreeStyle Antonio 2 Sensor) misc, CHANGE EVERY 14 DAYS., Disp: , Rfl:     empagliflozin (Jardiance) 10 MG tablet tablet, Take 1 tablet by mouth Every Morning., Disp: , Rfl:     glipizide (GLUCOTROL) 10 MG tablet, Take 1 tablet by mouth 2 (Two) Times a Day Before Meals., Disp: , Rfl:     isosorbide mononitrate (IMDUR) 30 MG 24 hr tablet, Take 1 tablet by mouth Daily.,  Disp: , Rfl:     Jardiance 10 MG tablet tablet, Take 1 tablet by mouth Every Morning., Disp: , Rfl:     Lantus SoloStar 100 UNIT/ML injection pen, INJECT 15 UNITS SUBCUTANEOUSLY ONCE DAILY AT BEDTIME, Disp: , Rfl:     lisinopril (PRINIVIL,ZESTRIL) 40 MG tablet, Take 1 tablet by mouth Daily. (Patient taking differently: Take 0.5 tablets by mouth Daily.), Disp: , Rfl:     meclizine (ANTIVERT) 25 MG tablet, Take 2 tablets by mouth 3 (Three) Times a Day As Needed for Dizziness or Nausea., Disp: 30 tablet, Rfl: 0    metoprolol tartrate (LOPRESSOR) 50 MG tablet, Take 1.5 tablets by mouth 2 (Two) Times a Day., Disp: , Rfl:     Mounjaro 7.5 MG/0.5ML solution auto-injector, INJECT 7.5 MG SUBCUTANEOUSLY ONCE A WEEK, Disp: , Rfl:     ondansetron ODT (ZOFRAN-ODT) 4 MG disintegrating tablet, Place 1 tablet on the tongue Every 8 (Eight) Hours As Needed for Nausea or Vomiting., Disp: 12 tablet, Rfl: 0    pantoprazole (PROTONIX) 20 MG EC tablet, Take 1 tablet by mouth Daily. (Patient taking differently: Take 2 tablets by mouth Daily.), Disp: , Rfl:     pregabalin (LYRICA) 50 MG capsule, Take 1 capsule by mouth 3 (Three) Times a Day., Disp: , Rfl:     SITagliptin (JANUVIA) 100 MG tablet, Take 1 tablet by mouth Daily., Disp: , Rfl:     topiramate (Topamax) 25 MG tablet, Take 1 tablet by mouth 2 (Two) Times a Day., Disp: 60 tablet, Rfl: 2    vitamin D (ERGOCALCIFEROL) 1.25 MG (19894 UT) capsule capsule, Take 1 capsule by mouth 1 (One) Time Per Week., Disp: , Rfl:     metoprolol succinate XL (TOPROL-XL) 50 MG 24 hr tablet, Take 1 tablet by mouth Daily., Disp: , Rfl:     naproxen (NAPROSYN) 375 MG tablet, TAKE 1 TABLET BY MOUTH TWICE DAILY WITH BREAKFAST AND WITH DINNER, Disp: , Rfl:     pregabalin (LYRICA) 75 MG capsule, Take 1 capsule by mouth 2 (Two) Times a Day. (Patient not taking: Reported on 3/10/2025), Disp: , Rfl:     ubrogepant (Ubrelvy) 100 MG tablet, TAKE ONE TABLET BY MOUTH AT ONSET OF MIGRAINE. IF SYMPTOMS PERSIST, A  "SECOND DOSE MAY BE TAKEN IN 2 HOURS. DO NOT EXCEED 2 DOSES IN A 24 HOUR PERIOD, UNLESS OTHERWISE INSTRUCTED BY YOUR PHYSICIAN, Disp: , Rfl:     vitamin B-12 (CYANOCOBALAMIN) 1000 MCG tablet, Take 1 tablet by mouth Daily. (Patient not taking: Reported on 3/10/2025), Disp: , Rfl:     Review of Systems   Respiratory:  Positive for shortness of breath.    Musculoskeletal:  Positive for arthralgias and back pain.       Objective     Vital Signs:  /84 (BP Location: Right arm, Patient Position: Sitting)   Pulse 74   Ht 147.3 cm (58\")   Wt 81 kg (178 lb 8 oz)   BMI 37.31 kg/m²   Estimated body mass index is 37.31 kg/m² as calculated from the following:    Height as of this encounter: 147.3 cm (58\").    Weight as of this encounter: 81 kg (178 lb 8 oz).       Class 2 Severe Obesity (BMI >=35 and <=39.9). Obesity-related health conditions include the following: obstructive sleep apnea, hypertension, diabetes mellitus, and osteoarthritis. Obesity is  due to lack of activity secondary to arthritis . We discussed low calorie, low carb based diet program, portion control, increasing exercise, and joining a fitness center or start home based exercise program.      Vitals reviewed.   Constitutional:       Appearance: Normal and healthy appearance.   Eyes:      Pupils: Pupils are equal, round, and reactive to light.   Pulmonary:      Effort: Pulmonary effort is normal.   Chest:      Chest wall: Not tender to palpatation.   Cardiovascular:      PMI at left midclavicular line. Normal rate. Regular rhythm.      No gallop.    Pulses:     Intact distal pulses.   Edema:     Peripheral edema absent.   Skin:     General: Skin is warm.   Psychiatric:         Behavior: Behavior is cooperative.           ECG 12 Lead    Date/Time: 3/10/2025 9:40 AM  Performed by: Ronald Estrada MD    Authorized by: Ronald Estrada MD  Rhythm: sinus rhythm  Other findings: poor R wave progression    Clinical impression: abnormal EKG          Data " "Review:   Lab Results   Component Value Date    GLUCOSE 397 (H) 09/23/2023    BUN 18 09/23/2023    CREATININE 0.79 09/23/2023    EGFRIFNONA 65 09/01/2017    BCR 22.8 09/23/2023    K 4.3 09/23/2023    CO2 22.7 09/23/2023    CALCIUM 9.5 09/23/2023    ALBUMIN 4.2 09/23/2023    AST 12 09/23/2023    ALT 12 09/23/2023     No results found for: \"CHOL\", \"CHLPL\", \"TRIG\", \"HDL\", \"LDL\", \"LDLDIRECT\"   Lab Results   Component Value Date    WBC 8.06 09/23/2023    RBC 4.55 09/23/2023    HGB 13.5 09/23/2023    HCT 40.6 09/23/2023    MCV 89.2 09/23/2023     09/23/2023     No results found for: \"TSH\"  No results found for: \"HGBA1C\"  No results found for: \"INR\", \"PROTIME\"    Labs dated 11/15/2024: Glucose 204 ALT 25, GFR greater than 60.   HDL 46 triglyceride 111.  Hemoglobin A1c 7.4.  No microalbuminuria.    Assessment and Plan     Assessment & Plan  Coronary artery disease involving native coronary artery of native heart with unstable angina pectoris  The patient symptoms are consistent with class IV angina, previously had a nuclear Cardiolite study which showed she may have had old inferior wall myocardial infarction, the patient EKG is also abnormal, the patient is seeing , I am discussed with Dr. Winters and will do Cardy catheterization possible angioplasty and stenting.  Discussed with patient detail about the procedure and risks.I have discussed with the patient in detail about his cardiac authorization and angioplasty risk, I have explained to him there is risk that includes intubation, dye allergy, dye damage to the kidneys, bleeding, damage to artery, vein and nerve, risk of heart attack, emergency bypass surgery, tamponade, loss of stent, dissection and death.    Orders:    ECG 12 Lead    Hyperlipidemia, mixed  Discussed with patient about starting on statin medication rosuvastatin 20 mg once a day.  Discussed in detail about the medication and its side effects and the benefits.    Orders:    ECG 12 " Lead    Benign essential hypertension  Discussed with patient to continue metoprolol succinate 50 mg once a day, lisinopril 40 mg once a day.    Orders:    ECG 12 Lead      Discussed with the patient compliance with medical management and follow-up.     Follow Up  Return for Follow-up post-testing.    Call if you have any significant symptoms or go to the [facility] room if possible.     Ronald Estrada MD, FACC,[facility].  [facility]    Part of this note may be an electronic transcription/translation of spoken language to printed text using the Dragon Dictation System.

## 2025-03-09 PROBLEM — I10 BENIGN ESSENTIAL HYPERTENSION: Status: ACTIVE | Noted: 2025-03-09

## 2025-03-09 PROBLEM — E78.2 HYPERLIPIDEMIA, MIXED: Status: ACTIVE | Noted: 2025-03-09

## 2025-03-09 NOTE — ASSESSMENT & PLAN NOTE
Discussed with patient about starting on statin medication rosuvastatin 20 mg once a day.  Discussed in detail about the medication and its side effects and the benefits.    Orders:    ECG 12 Lead

## 2025-03-09 NOTE — ASSESSMENT & PLAN NOTE
Discussed with patient to continue metoprolol succinate 50 mg once a day, lisinopril 40 mg once a day.    Orders:    ECG 12 Lead

## 2025-03-10 ENCOUNTER — OFFICE VISIT (OUTPATIENT)
Age: 65
End: 2025-03-10
Payer: MEDICARE

## 2025-03-10 VITALS
HEART RATE: 74 BPM | SYSTOLIC BLOOD PRESSURE: 152 MMHG | DIASTOLIC BLOOD PRESSURE: 84 MMHG | HEIGHT: 58 IN | BODY MASS INDEX: 37.47 KG/M2 | WEIGHT: 178.5 LBS

## 2025-03-10 DIAGNOSIS — E78.2 HYPERLIPIDEMIA, MIXED: Primary | ICD-10-CM

## 2025-03-10 DIAGNOSIS — I25.110 CORONARY ARTERY DISEASE INVOLVING NATIVE CORONARY ARTERY OF NATIVE HEART WITH UNSTABLE ANGINA PECTORIS: ICD-10-CM

## 2025-03-10 DIAGNOSIS — I10 BENIGN ESSENTIAL HYPERTENSION: ICD-10-CM

## 2025-03-10 PROCEDURE — 1160F RVW MEDS BY RX/DR IN RCRD: CPT | Performed by: INTERNAL MEDICINE

## 2025-03-10 PROCEDURE — 99204 OFFICE O/P NEW MOD 45 MIN: CPT | Performed by: INTERNAL MEDICINE

## 2025-03-10 PROCEDURE — 93000 ELECTROCARDIOGRAM COMPLETE: CPT | Performed by: INTERNAL MEDICINE

## 2025-03-10 PROCEDURE — 1159F MED LIST DOCD IN RCRD: CPT | Performed by: INTERNAL MEDICINE

## 2025-03-10 PROCEDURE — 3077F SYST BP >= 140 MM HG: CPT | Performed by: INTERNAL MEDICINE

## 2025-03-10 PROCEDURE — 3079F DIAST BP 80-89 MM HG: CPT | Performed by: INTERNAL MEDICINE

## 2025-03-10 RX ORDER — ATORVASTATIN CALCIUM 20 MG/1
1 TABLET, FILM COATED ORAL DAILY
COMMUNITY
Start: 2025-03-07 | End: 2025-03-12

## 2025-03-10 RX ORDER — ISOSORBIDE MONONITRATE 30 MG/1
30 TABLET, EXTENDED RELEASE ORAL DAILY
COMMUNITY

## 2025-03-10 RX ORDER — METOPROLOL TARTRATE 50 MG
50 TABLET ORAL 2 TIMES DAILY
COMMUNITY

## 2025-03-10 RX ORDER — PREGABALIN 50 MG/1
50 CAPSULE ORAL 3 TIMES DAILY
COMMUNITY

## 2025-03-10 NOTE — ASSESSMENT & PLAN NOTE
The patient symptoms are consistent with class IV angina, previously had a nuclear Cardiolite study which showed she may have had old inferior wall myocardial infarction, the patient EKG is also abnormal, the patient is seeing , I am discussed with Dr. Winters and will do Cardy catheterization possible angioplasty and stenting.  Discussed with patient detail about the procedure and risks.I have discussed with the patient in detail about his cardiac authorization and angioplasty risk, I have explained to him there is risk that includes intubation, dye allergy, dye damage to the kidneys, bleeding, damage to artery, vein and nerve, risk of heart attack, emergency bypass surgery, tamponade, loss of stent, dissection and death.    Orders:    ECG 12 Lead

## 2025-03-12 ENCOUNTER — TELEPHONE (OUTPATIENT)
Age: 65
End: 2025-03-12
Payer: MEDICARE

## 2025-03-12 RX ORDER — ROSUVASTATIN CALCIUM 20 MG/1
20 TABLET, COATED ORAL DAILY
Qty: 90 TABLET | Refills: 1 | Status: SHIPPED | OUTPATIENT
Start: 2025-03-12

## 2025-03-12 NOTE — TELEPHONE ENCOUNTER
Patient had an appt on 03/10/2025 and Dr. Estrada wanted to start the patient on Rosuvastatin 20mg QD. MQ never put an order in for this medication, so it was never sent to patient's pharmacy. It is, however, written in his office note that he wants the patient to start it.    Since this is a new medication, I cannot order it. Please send in prescription to pharmacy on file.

## 2025-03-24 DIAGNOSIS — G43.009 MIGRAINE WITHOUT AURA AND WITHOUT STATUS MIGRAINOSUS, NOT INTRACTABLE: ICD-10-CM

## 2025-03-24 DIAGNOSIS — R51.9 PERSISTENT HEADACHES: ICD-10-CM

## 2025-03-24 RX ORDER — TOPIRAMATE 25 MG/1
25 TABLET, FILM COATED ORAL 2 TIMES DAILY
Qty: 60 TABLET | Refills: 1 | Status: SHIPPED | OUTPATIENT
Start: 2025-03-24

## 2025-04-15 ENCOUNTER — OFFICE VISIT (OUTPATIENT)
Dept: NEUROLOGY | Facility: CLINIC | Age: 65
End: 2025-04-15
Payer: MEDICARE

## 2025-04-15 VITALS
BODY MASS INDEX: 37.57 KG/M2 | HEART RATE: 60 BPM | HEIGHT: 58 IN | OXYGEN SATURATION: 100 % | SYSTOLIC BLOOD PRESSURE: 110 MMHG | WEIGHT: 179 LBS | TEMPERATURE: 97.8 F | DIASTOLIC BLOOD PRESSURE: 70 MMHG

## 2025-04-15 DIAGNOSIS — G43.909 ACUTE MIGRAINE: ICD-10-CM

## 2025-04-15 DIAGNOSIS — G43.009 MIGRAINE WITHOUT AURA AND WITHOUT STATUS MIGRAINOSUS, NOT INTRACTABLE: Primary | ICD-10-CM

## 2025-04-15 DIAGNOSIS — R51.9 PERSISTENT HEADACHES: ICD-10-CM

## 2025-04-15 DIAGNOSIS — R41.89 SUBJECTIVE MEMORY COMPLAINTS: ICD-10-CM

## 2025-04-15 RX ORDER — SERTRALINE HYDROCHLORIDE 25 MG/1
1 TABLET, FILM COATED ORAL DAILY
COMMUNITY
Start: 2025-04-03

## 2025-04-15 RX ORDER — TOPIRAMATE 50 MG/1
50 TABLET, FILM COATED ORAL 2 TIMES DAILY
Qty: 60 TABLET | Refills: 5 | Status: SHIPPED | OUTPATIENT
Start: 2025-04-15 | End: 2025-04-15

## 2025-04-15 RX ORDER — TOPIRAMATE 50 MG/1
50 TABLET, FILM COATED ORAL 2 TIMES DAILY
Qty: 60 TABLET | Refills: 5 | Status: SHIPPED | OUTPATIENT
Start: 2025-04-15

## 2025-04-15 NOTE — PROGRESS NOTES
"     Follow Up Office Visit      Patient Name: Lizette Chong  : 1960   MRN: [id number]     Chief Complaint:    Chief Complaint   Patient presents with    Follow-up     Migraines; also reports memory concerns with increased stress       History of Present Illness: Lizette Chong is a 64 y.o. female who is here today to follow up with headaches and also has some concerns with memory.  She is currently on topiramate 25 mg twice daily for her headaches.  At last office visit she was having 3-4 migraines per month and 30 out of 30 headache days. She says she seems to keep a HA. She says she is compliant with her CPAP \"even if I lay down in the day\". She is followed by Dr. Morelos for this and see's him in the near future.   -She has to have a heart cath tomorrow by Dr Metz for some CP.   -She has been under a lot of stress as her ,other has dementia and says he gets upset if she leaves the room or the house. She says it has made the HA's more frequent and she is now on Sertraline.  She says she has a lot of tightness in her neck and shoulders and feels it is related to stress but will often increase her headaches and make them more frequent.  -11/15/2024 CMP showed normal kidney and liver function.  Her A1c was 7.4, vitamin D was 20.2 but this was an increase from September when it was 9.9  - She has an eye exam scheduled as she is overdue for this.  Her headache still causes photophobia, phonophobia, blurred vision at times.  She has pressure on either side of her head and lately it has been both sides at the same time.  History of Present Illness carried forward from her 2024 office note as follows: Lizette Chong is a 64 y.o. female who is here today to establish care with Neurology.  She reports daily headaches to the left side of the head \"pressure\" and says at times  it will worsen to where she has blurred  vision, with photophobia and phonophobia and become a migraine. Reports she is having " 3 MD's per month. She says the Topiramate was helping with these but she has been out since April. Reports she was at that time seeing Neuro at  [facility].   She has had headaches since she was 19 and says she had migraines as well.   She is under a lot of stress  with her mom who has dementia and she is caring for teenagers and her 35 yr old nephew lives with her and she is caring for all of them. She says this has been playing a toll on her and she  has been having some memory issues with this.    -CT Head WO on 9/23/2023  FINDINGS:There is no evidence of acute hemorrhage. No evidence of mass  effect or midline shift. There is no evidence of displaced fracture.  Mild cerebral atrophy noted.. Paranasal sinuses and mastoid air cells  are clear. Consider further assessment with MRI if clinically warranted.     IMPRESSION:  No evidence of acute hemorrhage, mass effect, or midline  shift.      Pertinent Medical History: KEVIN-wears CPAP religiously ( Followed by Dr Rosales), DM, diabetic peripheral neuropathy, RLS, Vit B and Vit D deficiency    Subjective       Subjective      Review of Systems:   Review of Systems   Eyes:  Positive for photophobia.   Neurological:  Positive for headache.       I have reviewed and the following portions of the patient's history were updated as appropriate: past family history, past medical history, past social history, past surgical history and problem list.    Medications:     Current Outpatient Medications:     aspirin 81 MG EC tablet, Take 1 tablet by mouth Daily., Disp: , Rfl:     Continuous Glucose  (FreeStyle Antonio 2 Douglas) device, See Admin Instructions., Disp: , Rfl:     Continuous Glucose Sensor (FreeStyle Antonio 2 Sensor) [facility], CHANGE EVERY 14 DAYS., Disp: , Rfl:     empagliflozin (Jardiance) 10 MG tablet tablet, Take 1 tablet by mouth Every Morning., Disp: , Rfl:     glipizide (GLUCOTROL) 10 MG tablet, Take 1 tablet by mouth 2 (Two) Times a Day Before Meals., Disp: ,  Rfl:     isosorbide mononitrate (IMDUR) 30 MG 24 hr tablet, Take 1 tablet by mouth Daily., Disp: , Rfl:     Jardiance 10 MG tablet tablet, Take 1 tablet by mouth Every Morning., Disp: , Rfl:     Lantus SoloStar 100 UNIT/ML injection pen, INJECT 15 UNITS SUBCUTANEOUSLY ONCE DAILY AT BEDTIME, Disp: , Rfl:     lisinopril (PRINIVIL,ZESTRIL) 40 MG tablet, Take 1 tablet by mouth Daily. (Patient taking differently: Take 0.5 tablets by mouth Daily.), Disp: , Rfl:     meclizine (ANTIVERT) 25 MG tablet, Take 2 tablets by mouth 3 (Three) Times a Day As Needed for Dizziness or Nausea., Disp: 30 tablet, Rfl: 0    metoprolol tartrate (LOPRESSOR) 50 MG tablet, Take 1 tablet by mouth 2 (Two) Times a Day. 1.5 bid, Disp: , Rfl:     Mounjaro 7.5 MG/0.5ML solution auto-injector, INJECT 7.5 MG SUBCUTANEOUSLY ONCE A WEEK, Disp: , Rfl:     ondansetron ODT (ZOFRAN-ODT) 4 MG disintegrating tablet, Place 1 tablet on the tongue Every 8 (Eight) Hours As Needed for Nausea or Vomiting., Disp: 12 tablet, Rfl: 0    pantoprazole (PROTONIX) 20 MG EC tablet, Take 1 tablet by mouth Daily. (Patient taking differently: Take 2 tablets by mouth Daily.), Disp: , Rfl:     pregabalin (LYRICA) 50 MG capsule, Take 2 capsules by mouth 2 (Two) Times a Day., Disp: , Rfl:     pregabalin (LYRICA) 75 MG capsule, Take 1 capsule by mouth 2 (Two) Times a Day., Disp: , Rfl:     rosuvastatin (CRESTOR) 20 MG tablet, Take 1 tablet by mouth Daily., Disp: 90 tablet, Rfl: 1    sertraline (ZOLOFT) 25 MG tablet, Take 1 tablet by mouth Daily., Disp: , Rfl:     SITagliptin (JANUVIA) 100 MG tablet, Take 1 tablet by mouth Daily., Disp: , Rfl:     topiramate (TOPAMAX) 50 MG tablet, Take 1 tablet by mouth 2 (Two) Times a Day., Disp: 60 tablet, Rfl: 5    vitamin D (ERGOCALCIFEROL) 1.25 MG (57587 UT) capsule capsule, Take 1 capsule by mouth 1 (One) Time Per Week., Disp: , Rfl:     ubrogepant (Ubrelvy) 100 MG tablet, Take 1 tablet by mouth Daily As Needed (migraine)., Disp: 16 tablet,  "Rfl: 5    Allergies:   Allergies   Allergen Reactions    Nabumetone Hives    Phenobarbital-Belladonna Alk Other (See Comments)     HEADACHES PT NOT SURE         Objective     Physical Exam:  Vital Signs:   Vitals:    04/15/25 0837   BP: 110/70   Pulse: 60   Temp: 97.8 °F (36.6 °C)   SpO2: 100%   Weight: 81.2 kg (179 lb)   Height: 147.3 cm (58\")   PainSc: 0-No pain     Body mass index is 37.41 kg/m².    Physical Exam  Constitutional:       Appearance: Normal appearance.   HENT:      Head: Normocephalic.   Eyes:      General: Lids are normal.      Extraocular Movements: Extraocular movements intact.      Conjunctiva/sclera: Conjunctivae normal.   Pulmonary:      Effort: Pulmonary effort is normal.   Musculoskeletal:         General: Normal range of motion.   Skin:     General: Skin is warm and dry.   Neurological:      General: No focal deficit present.      Mental Status: She is alert and oriented to person, place, and time.      Cranial Nerves: Cranial nerves 2-12 are intact. No dysarthria.      Motor: Motor strength is normal.Motor function is intact. No tremor.      Coordination: Finger-Nose-Finger Test normal.   Psychiatric:         Attention and Perception: Attention normal.         Mood and Affect: Mood and affect normal.         Speech: Speech normal.         Behavior: Behavior normal. Behavior is cooperative.         Thought Content: Thought content normal.         Cognition and Memory: Cognition normal.         Judgment: Judgment normal.         Neurological Exam  Mental Status  Alert. Oriented to person, place, time and situation. Oriented to person, place, and time. Recalls 3 of 3 objects immediately. At 3 minutes recalls 3 of 3 objects. Able to copy figure. Speech is normal. no dysarthria present. Able to name objects, repeat, read and write. Follows three-step commands. Able to spell words backwards. MMSE score: 30.    Cranial Nerves  CN III, IV, VI: Extraocular movements intact bilaterally. Normal lids " and orbits bilaterally.  CN V: Facial sensation is normal.  CN VII: Full and symmetric facial movement.  CN IX, X: Palate elevates symmetrically  CN XI: Shoulder shrug strength is normal.  CN XII: Tongue midline without atrophy or fasciculations.    Motor  Normal muscle bulk throughout. No fasciculations present. Normal muscle tone. No abnormal involuntary movements. Strength is 5/5 throughout all four extremities.    Coordination  No tremor    Gait  Casual gait is normal including stance, stride, and arm swing.      Assessment / Plan      Assessment/Plan:   Diagnoses and all orders for this visit:    1. Migraine without aura and without status migrainosus, not intractable (Primary)  -     Discontinue: topiramate (TOPAMAX) 50 MG tablet; Take 1 tablet by mouth 2 (Two) Times a Day.  Dispense: 60 tablet; Refill: 5  -     topiramate (TOPAMAX) 50 MG tablet; Take 1 tablet by mouth 2 (Two) Times a Day.  Dispense: 60 tablet; Refill: 5    2. Subjective memory complaints    3. Persistent headaches  -     Discontinue: topiramate (TOPAMAX) 50 MG tablet; Take 1 tablet by mouth 2 (Two) Times a Day.  Dispense: 60 tablet; Refill: 5  -     topiramate (TOPAMAX) 50 MG tablet; Take 1 tablet by mouth 2 (Two) Times a Day.  Dispense: 60 tablet; Refill: 5    4. Acute migraine  -     ubrogepant (Ubrelvy) 100 MG tablet; Take 1 tablet by mouth Daily As Needed (migraine).  Dispense: 16 tablet; Refill: 5           Pleasant 64-year-old female patient here to follow-up with migraine headaches.  She also feels that she has been having some brain fog and trouble with short-term memory recall.  Her MMSE score today was 30.  I feel a huge component of this is related to all of her stressors.  Taking care of 5 people in the family including her mother with dementia as well as some teenagers, adult son and .  We will continue to monitor this.  Will increase topiramate to 50 mg twice daily as patient states she has already done this on her own  and is tolerating well.  Indications and side effects discussed with patient she verbalizes understanding.  Patient will call in the interim if she has any questions or concerns or changes.    Follow Up:   Return in about 3 months (around 7/15/2025).    MARCIN Davis, FNP-[facility]

## 2025-04-16 ENCOUNTER — HOSPITAL ENCOUNTER (OUTPATIENT)
Facility: HOSPITAL | Age: 65
Setting detail: HOSPITAL OUTPATIENT SURGERY
Discharge: HOME | End: 2025-04-16
Attending: INTERNAL MEDICINE | Admitting: INTERNAL MEDICINE
Payer: MEDICARE

## 2025-04-16 VITALS — HEART RATE: 63 BPM | DIASTOLIC BLOOD PRESSURE: 99 MMHG | OXYGEN SATURATION: 98 % | SYSTOLIC BLOOD PRESSURE: 169 MMHG

## 2025-04-16 DIAGNOSIS — I25.110 CORONARY ARTERY DISEASE INVOLVING NATIVE CORONARY ARTERY OF NATIVE HEART WITH UNSTABLE ANGINA PECTORIS: ICD-10-CM

## 2025-04-16 DIAGNOSIS — E78.2 HYPERLIPIDEMIA, MIXED: ICD-10-CM

## 2025-04-16 DIAGNOSIS — I10 BENIGN ESSENTIAL HYPERTENSION: ICD-10-CM

## 2025-04-16 LAB
ANION GAP SERPL CALCULATED.3IONS-SCNC: 12 MMOL/L (ref 5–15)
BASOPHILS # BLD AUTO: 0.05 10*3/MM3 (ref 0–0.2)
BASOPHILS NFR BLD AUTO: 0.7 % (ref 0–1.5)
BUN SERPL-MCNC: 14 MG/DL (ref 8–23)
BUN/CREAT SERPL: 16.5 (ref 7–25)
CALCIUM SPEC-SCNC: 9.7 MG/DL (ref 8.6–10.5)
CHLORIDE SERPL-SCNC: 104 MMOL/L (ref 98–107)
CO2 SERPL-SCNC: 24 MMOL/L (ref 22–29)
CREAT SERPL-MCNC: 0.85 MG/DL (ref 0.57–1)
DEPRECATED RDW RBC AUTO: 42.4 FL (ref 37–54)
EGFRCR SERPLBLD CKD-EPI 2021: 77.1 ML/MIN/1.73
EOSINOPHIL # BLD AUTO: 0.06 10*3/MM3 (ref 0–0.4)
EOSINOPHIL NFR BLD AUTO: 0.8 % (ref 0.3–6.2)
ERYTHROCYTE [DISTWIDTH] IN BLOOD BY AUTOMATED COUNT: 13.1 % (ref 12.3–15.4)
GLUCOSE SERPL-MCNC: 236 MG/DL (ref 65–99)
HCT VFR BLD AUTO: 42.4 % (ref 34–46.6)
HGB BLD-MCNC: 14 G/DL (ref 12–15.9)
IMM GRANULOCYTES # BLD AUTO: 0.03 10*3/MM3 (ref 0–0.05)
IMM GRANULOCYTES NFR BLD AUTO: 0.4 % (ref 0–0.5)
LYMPHOCYTES # BLD AUTO: 2.2 10*3/MM3 (ref 0.7–3.1)
LYMPHOCYTES NFR BLD AUTO: 30.6 % (ref 19.6–45.3)
MCH RBC QN AUTO: 30.1 PG (ref 26.6–33)
MCHC RBC AUTO-ENTMCNC: 33 G/DL (ref 31.5–35.7)
MCV RBC AUTO: 91.2 FL (ref 79–97)
MONOCYTES # BLD AUTO: 0.42 10*3/MM3 (ref 0.1–0.9)
MONOCYTES NFR BLD AUTO: 5.8 % (ref 5–12)
NEUTROPHILS NFR BLD AUTO: 4.42 10*3/MM3 (ref 1.7–7)
NEUTROPHILS NFR BLD AUTO: 61.7 % (ref 42.7–76)
NRBC BLD AUTO-RTO: 0 /100 WBC (ref 0–0.2)
PLATELET # BLD AUTO: 227 10*3/MM3 (ref 140–450)
PMV BLD AUTO: 10.6 FL (ref 6–12)
POTASSIUM SERPL-SCNC: 4.4 MMOL/L (ref 3.5–5.2)
QT INTERVAL: 424 MS
QTC INTERVAL: 433 MS
RBC # BLD AUTO: 4.65 10*6/MM3 (ref 3.77–5.28)
SODIUM SERPL-SCNC: 140 MMOL/L (ref 136–145)
WBC NRBC COR # BLD AUTO: 7.18 10*3/MM3 (ref 3.4–10.8)

## 2025-04-16 PROCEDURE — 93458 L HRT ARTERY/VENTRICLE ANGIO: CPT | Performed by: INTERNAL MEDICINE

## 2025-04-16 PROCEDURE — 25010000002 NICARDIPINE 2.5 MG/ML SOLUTION: Performed by: INTERNAL MEDICINE

## 2025-04-16 PROCEDURE — C1894 INTRO/SHEATH, NON-LASER: HCPCS | Performed by: INTERNAL MEDICINE

## 2025-04-16 PROCEDURE — C1769 GUIDE WIRE: HCPCS | Performed by: INTERNAL MEDICINE

## 2025-04-16 PROCEDURE — 25510000001 IOPAMIDOL PER 1 ML: Performed by: INTERNAL MEDICINE

## 2025-04-16 PROCEDURE — 25810000003 SODIUM CHLORIDE 0.9 % SOLUTION: Performed by: INTERNAL MEDICINE

## 2025-04-16 PROCEDURE — 25010000002 LIDOCAINE PF 1% 1 % SOLUTION: Performed by: INTERNAL MEDICINE

## 2025-04-16 PROCEDURE — 25010000002 MIDAZOLAM PER 1 MG: Performed by: INTERNAL MEDICINE

## 2025-04-16 PROCEDURE — 80048 BASIC METABOLIC PNL TOTAL CA: CPT | Performed by: INTERNAL MEDICINE

## 2025-04-16 PROCEDURE — 85025 COMPLETE CBC W/AUTO DIFF WBC: CPT | Performed by: INTERNAL MEDICINE

## 2025-04-16 PROCEDURE — 36415 COLL VENOUS BLD VENIPUNCTURE: CPT

## 2025-04-16 PROCEDURE — 25010000002 FENTANYL CITRATE (PF) 50 MCG/ML SOLUTION: Performed by: INTERNAL MEDICINE

## 2025-04-16 PROCEDURE — 25010000002 HEPARIN (PORCINE) PER 1000 UNITS: Performed by: INTERNAL MEDICINE

## 2025-04-16 PROCEDURE — 93005 ELECTROCARDIOGRAM TRACING: CPT | Performed by: INTERNAL MEDICINE

## 2025-04-16 RX ORDER — LIDOCAINE HYDROCHLORIDE 10 MG/ML
INJECTION, SOLUTION EPIDURAL; INFILTRATION; INTRACAUDAL; PERINEURAL
Status: DISCONTINUED | OUTPATIENT
Start: 2025-04-16 | End: 2025-04-16 | Stop reason: HOSPADM

## 2025-04-16 RX ORDER — NICARDIPINE HYDROCHLORIDE 2.5 MG/ML
INJECTION INTRAVENOUS
Status: DISCONTINUED | OUTPATIENT
Start: 2025-04-16 | End: 2025-04-16 | Stop reason: HOSPADM

## 2025-04-16 RX ORDER — ACETAMINOPHEN 325 MG/1
650 TABLET ORAL EVERY 4 HOURS PRN
Status: DISCONTINUED | OUTPATIENT
Start: 2025-04-16 | End: 2025-04-16 | Stop reason: HOSPADM

## 2025-04-16 RX ORDER — SODIUM CHLORIDE 0.9 % (FLUSH) 0.9 %
10 SYRINGE (ML) INJECTION EVERY 12 HOURS SCHEDULED
Status: DISCONTINUED | OUTPATIENT
Start: 2025-04-16 | End: 2025-04-16 | Stop reason: HOSPADM

## 2025-04-16 RX ORDER — GLIPIZIDE 10 MG/1
10 TABLET, FILM COATED, EXTENDED RELEASE ORAL DAILY
COMMUNITY

## 2025-04-16 RX ORDER — SODIUM CHLORIDE 9 MG/ML
40 INJECTION, SOLUTION INTRAVENOUS AS NEEDED
Status: DISCONTINUED | OUTPATIENT
Start: 2025-04-16 | End: 2025-04-16 | Stop reason: HOSPADM

## 2025-04-16 RX ORDER — FENTANYL CITRATE 50 UG/ML
INJECTION, SOLUTION INTRAMUSCULAR; INTRAVENOUS
Status: DISCONTINUED | OUTPATIENT
Start: 2025-04-16 | End: 2025-04-16 | Stop reason: HOSPADM

## 2025-04-16 RX ORDER — SODIUM CHLORIDE 0.9 % (FLUSH) 0.9 %
10 SYRINGE (ML) INJECTION AS NEEDED
Status: DISCONTINUED | OUTPATIENT
Start: 2025-04-16 | End: 2025-04-16 | Stop reason: HOSPADM

## 2025-04-16 RX ORDER — MIDAZOLAM HYDROCHLORIDE 1 MG/ML
INJECTION, SOLUTION INTRAMUSCULAR; INTRAVENOUS
Status: DISCONTINUED | OUTPATIENT
Start: 2025-04-16 | End: 2025-04-16 | Stop reason: HOSPADM

## 2025-04-16 RX ORDER — HEPARIN SODIUM 1000 [USP'U]/ML
INJECTION, SOLUTION INTRAVENOUS; SUBCUTANEOUS
Status: DISCONTINUED | OUTPATIENT
Start: 2025-04-16 | End: 2025-04-16 | Stop reason: HOSPADM

## 2025-04-16 RX ORDER — IOPAMIDOL 755 MG/ML
INJECTION, SOLUTION INTRAVASCULAR
Status: DISCONTINUED | OUTPATIENT
Start: 2025-04-16 | End: 2025-04-16 | Stop reason: HOSPADM

## 2025-04-16 RX ADMIN — SODIUM CHLORIDE 243 ML: 9 INJECTION, SOLUTION INTRAVENOUS at 14:25

## 2025-04-16 NOTE — H&P
Cardiology New Patient Note     Name: Lizette Chong  :   1960  PCP: Agnes Contreras, APRN  Date:   03/10/2025  Department: [facility] [place] [facility] CARDIOLOGY  [address]  [facility] [address]  Fax [phone]  Phone [phone]    No chief complaint on file.    Subjective     History of Present Illness  Lizette Chong is a 64 y.o. female who presents today as a new patient.  Per chart review, patient previously seen Dr. Astorga in .  Past medical history of COPD, sleep apnea, hypertension, hyperlipidemia and diabetes type 2.  The patient has history of abnormal nuclear Cardiolite study dated 2023 revealing fixed apical defect and fixed inferior defect possibly diaphragmatic attenuation no reversible ischemia seen.  The patient states that she has been having symptoms of chest tightness and heaviness in the middle of the chest, radiating times to her left arm, happens at night, comes with also exertion, associated with shortness of breath.  The symptoms are at rest, progressively getting worse, patient states that the symptoms can last up to an hour.  The patient is limited with exercise due to bad knees.    Past Medical History:   Diagnosis Date    Anemia 2019    Anxiety     Arthritis     Asthma, extrinsic 1998    It gets worse during over exercise and if i have a cold.    B12 deficiency     Bronchiectasis     I had a bad case of bronchitis before covid-19 started    Chronic bronchitis     COPD (chronic obstructive pulmonary disease)     Coronary artery disease     I dont know for sure if i have it my records say i do    CTS (carpal tunnel syndrome)     Diabetes mellitus About 2015    Difficulty walking About 5 years ago    I wobble and my feet hurt all the time    Disease of thyroid gland     GERD (gastroesophageal reflux disease)     Headache, tension-type     Hiatal hernia     HL (hearing loss)     Hypertension      Migraine Age 19    Neuropathy     BOTH FEET    Neuropathy in diabetes 2014    Pneumonia 2018?    I think i had it then    Pre-diabetes     Primary central sleep apnea Dont remember    I dont know what kind i have    RLS (restless legs syndrome)     Sleep apnea with use of continuous positive airway pressure (CPAP)     Sleep apnea, obstructive Dont know    Wears glasses       Past Surgical History:   Procedure Laterality Date    BREAST BIOPSY Bilateral     Pt states needle biopsies bilaterally - unsure of dates    CARDIAC CATHETERIZATION      I have one this 2025    CHOLECYSTECTOMY      COLONOSCOPY  2012    ELBOW ARTHROSCOPY Right     ENDOSCOPY  2012    HYSTERECTOMY  2001    Total    KNEE ARTHROSCOPY Left 09/15/2017    Procedure: KNEE ARTHROSCOPY LEFT WITH PARTIAL MEDIAL MENISECTOMY;  Surgeon: Mike Beasley MD;  Location: [facility] OR;  Service:     KNEE SURGERY Left     PINS PLACED TO STRAIGHTEN PATELLA    ROTATOR CUFF REPAIR Right      Family History   Problem Relation Age of Onset    Breast cancer Maternal Grandmother     Migraines Maternal Grandmother     Emphysema Maternal Grandmother     Breast cancer Maternal Aunt     Breast cancer Cousin     Dementia Mother         Mom was diagnosed with Alzheimers in     Cancer Mother         Lung cancer    Emphysema Mother     Heart failure Mother         Two stints, heart attack    Hypertension Mother     Heart attack Mother     Dementia Father         Before my dad passed he had  dementia    Migraines Father     Seizures Father     Stroke Father         Dad had a TIA after his triple bypass    Asthma Father         My father had asthma, copd, and emphysema    Cancer Father         Prostate cancer    Emphysema Father     Heart failure Father         Father  of heart failure triple bypass, pacemaker    Hypertension Father     Heart disease Father     Stroke Sister     Cancer Sister         Colon cancer    Diabetes  "Sister     Heart failure Sister         She has had 2 strokes    Hypertension Sister     Cancer Brother         Stage 4 prostate cancer and has gone into his bones    Hypertension Sister     Hypertension Sister     Cancer Brother         Stage 4 prostate cancer and has gone into his bones    Hypertension Sister     Hypertension Sister     Ovarian cancer Neg Hx      Social History     Socioeconomic History    Marital status: Single   Tobacco Use    Smoking status: Never    Smokeless tobacco: Never   Vaping Use    Vaping status: Never Used   Substance and Sexual Activity    Alcohol use: No    Drug use: Never    Sexual activity: Never     Allergies   Allergen Reactions    Nabumetone Hives    Phenobarbital-Belladonna Alk Other (See Comments)     HEADACHES PT NOT SURE         Current Facility-Administered Medications:     sodium chloride 0.9 % bolus 243 mL, 3 mL/kg, Intravenous, Once Over 1 Hour, Ronald Estrada MD, Last Rate: 243 mL/hr at 04/16/25 1425, 243 mL at 04/16/25 1425    Review of Systems   Respiratory:  Positive for shortness of breath.    Musculoskeletal:  Positive for arthralgias and back pain.       Objective     Vital Signs:  BP (!) 166/108 (BP Location: Left arm)   Pulse 60   SpO2 97%   Estimated body mass index is 37.41 kg/m² as calculated from the following:    Height as of 4/15/25: 147.3 cm (58\").    Weight as of 4/15/25: 81.2 kg (179 lb).       Class 2 Severe Obesity (BMI >=35 and <=39.9). Obesity-related health conditions include the following: obstructive sleep apnea, hypertension, diabetes mellitus, and osteoarthritis. Obesity is  due to lack of activity secondary to arthritis . We discussed low calorie, low carb based diet program, portion control, increasing exercise, and joining a fitness center or start home based exercise program.      Vitals reviewed.   Constitutional:       Appearance: Normal and healthy appearance.   Eyes:      Pupils: Pupils are equal, round, and reactive to light. " "  Pulmonary:      Effort: Pulmonary effort is normal.   Chest:      Chest wall: Not tender to palpatation.   Cardiovascular:      PMI at left midclavicular line. Normal rate. Regular rhythm.      No gallop.    Pulses:     Intact distal pulses.   Edema:     Peripheral edema absent.   Skin:     General: Skin is warm.   Psychiatric:         Behavior: Behavior is cooperative.              Data Review:   Lab Results   Component Value Date    GLUCOSE 236 (H) 04/16/2025    BUN 14 04/16/2025    CREATININE 0.85 04/16/2025    EGFRIFNONA 65 09/01/2017    BCR 16.5 04/16/2025    K 4.4 04/16/2025    CO2 24.0 04/16/2025    CALCIUM 9.7 04/16/2025    ALBUMIN 4.2 09/23/2023    AST 12 09/23/2023    ALT 12 09/23/2023     No results found for: \"CHOL\", \"CHLPL\", \"TRIG\", \"HDL\", \"LDL\", \"LDLDIRECT\"   Lab Results   Component Value Date    WBC 7.18 04/16/2025    RBC 4.65 04/16/2025    HGB 14.0 04/16/2025    HCT 42.4 04/16/2025    MCV 91.2 04/16/2025     04/16/2025     No results found for: \"TSH\"  No results found for: \"HGBA1C\"  No results found for: \"INR\", \"PROTIME\"    Labs dated 11/15/2024: Glucose 204 ALT 25, GFR greater than 60.   HDL 46 triglyceride 111.  Hemoglobin A1c 7.4.  No microalbuminuria.    Assessment and Plan     Assessment & Plan  Coronary artery disease involving native coronary artery of native heart with unstable angina pectoris  The patient has class IV anginal symptoms, previously had abnormal cardiovascular study done with , discussed with patient in detail in the office and now about cardiac authorization option with possibility of angioplasty and stenting.I have discussed with the patient in detail about his cardiac authorization and angioplasty risk, I have explained to the patient that there is risks with the procedure that includes intubation, dye allergy, dye damage to the kidneys, bleeding, damage to artery, vein and nerve, risk of heart attack, emergency bypass surgery, tamponade, loss of " stent, dissection and death.    Orders from past 72 hours:    CBC & Differential; Standing    Basic Metabolic Panel; Standing    ECG 12 Lead Pre-Op / Pre-Procedure; Standing    Obtain Informed Consent; Standing    Clip Bilateral Groins; Standing    Obtain Informed Consent in Pre-Op; Standing    sodium chloride 0.9 % bolus 243 mL    Hyperlipidemia, mixed  The patient will continue rosuvastatin 20 mg once    Orders from past 72 hours:    CBC & Differential; Standing    Basic Metabolic Panel; Standing    ECG 12 Lead Pre-Op / Pre-Procedure; Standing    Obtain Informed Consent; Standing    Clip Bilateral Groins; Standing    Obtain Informed Consent in Pre-Op; Standing    sodium chloride 0.9 % bolus 243 mL    Benign essential hypertension  The patient will continue metoprolol and lisinopril.    Orders from past 72 hours:    CBC & Differential; Standing    Basic Metabolic Panel; Standing    ECG 12 Lead Pre-Op / Pre-Procedure; Standing    Obtain Informed Consent; Standing    Clip Bilateral Groins; Standing    Obtain Informed Consent in Pre-Op; Standing    sodium chloride 0.9 % bolus 243 mL      Discussed with the patient compliance with medical management and follow-up.     Follow Up  No follow-ups on file.    Call if you have any significant symptoms or go to the [facility] if possible.     Ronald Estrada MD, FACC,[facility]  [place]    Part of this note may be an electronic transcription/translation of spoken language to printed text using the Dragon Dictation System.

## 2025-04-16 NOTE — ASSESSMENT & PLAN NOTE
The patient will continue metoprolol and lisinopril.    Orders from past 72 hours:    CBC & Differential; Standing    Basic Metabolic Panel; Standing    ECG 12 Lead Pre-Op / Pre-Procedure; Standing    Obtain Informed Consent; Standing    Clip Bilateral Groins; Standing    Obtain Informed Consent in Pre-Op; Standing    sodium chloride 0.9 % bolus 243 mL

## 2025-04-16 NOTE — Clinical Note
Hemostasis started on the right radial artery. R-Band was used in achieving hemostasis. Radial compression device applied to vessel. Hemostasis achieved successfully. Closure device additional comment: 10CC

## 2025-04-16 NOTE — ASSESSMENT & PLAN NOTE
The patient will continue rosuvastatin 20 mg once    Orders from past 72 hours:    CBC & Differential; Standing    Basic Metabolic Panel; Standing    ECG 12 Lead Pre-Op / Pre-Procedure; Standing    Obtain Informed Consent; Standing    Clip Bilateral Groins; Standing    Obtain Informed Consent in Pre-Op; Standing    sodium chloride 0.9 % bolus 243 mL

## 2025-04-16 NOTE — ASSESSMENT & PLAN NOTE
The patient has class IV anginal symptoms, previously had abnormal cardiovascular study done with , discussed with patient in detail in the office and now about cardiac authorization option with possibility of angioplasty and stenting.I have discussed with the patient in detail about his cardiac authorization and angioplasty risk, I have explained to the patient that there is risks with the procedure that includes intubation, dye allergy, dye damage to the kidneys, bleeding, damage to artery, vein and nerve, risk of heart attack, emergency bypass surgery, tamponade, loss of stent, dissection and death.    Orders from past 72 hours:    CBC & Differential; Standing    Basic Metabolic Panel; Standing    ECG 12 Lead Pre-Op / Pre-Procedure; Standing    Obtain Informed Consent; Standing    Clip Bilateral Groins; Standing    Obtain Informed Consent in Pre-Op; Standing    sodium chloride 0.9 % bolus 243 mL

## 2025-04-17 ENCOUNTER — DOCUMENTATION (OUTPATIENT)
Dept: CARDIAC REHAB | Facility: HOSPITAL | Age: 65
End: 2025-04-17
Payer: MEDICARE

## 2025-04-25 ENCOUNTER — OFFICE VISIT (OUTPATIENT)
Dept: PULMONOLOGY | Facility: CLINIC | Age: 65
End: 2025-04-25
Payer: MEDICARE

## 2025-04-25 VITALS
HEIGHT: 58 IN | HEART RATE: 72 BPM | BODY MASS INDEX: 36.31 KG/M2 | OXYGEN SATURATION: 98 % | RESPIRATION RATE: 16 BRPM | DIASTOLIC BLOOD PRESSURE: 78 MMHG | WEIGHT: 173 LBS | SYSTOLIC BLOOD PRESSURE: 118 MMHG

## 2025-04-25 DIAGNOSIS — R93.89 ABNORMAL CHEST X-RAY: ICD-10-CM

## 2025-04-25 DIAGNOSIS — R06.02 SOB (SHORTNESS OF BREATH): ICD-10-CM

## 2025-04-25 DIAGNOSIS — G47.33 OBSTRUCTIVE SLEEP APNEA: Primary | ICD-10-CM

## 2025-04-25 DIAGNOSIS — E66.9 OBESITY (BMI 30-39.9): ICD-10-CM

## 2025-04-25 PROCEDURE — 99214 OFFICE O/P EST MOD 30 MIN: CPT | Performed by: NURSE PRACTITIONER

## 2025-04-25 PROCEDURE — 3074F SYST BP LT 130 MM HG: CPT | Performed by: NURSE PRACTITIONER

## 2025-04-25 PROCEDURE — 3078F DIAST BP <80 MM HG: CPT | Performed by: NURSE PRACTITIONER

## 2025-04-25 RX ORDER — INSULIN GLARGINE 100 [IU]/ML
INJECTION, SOLUTION SUBCUTANEOUS
COMMUNITY

## 2025-04-25 NOTE — PROGRESS NOTES
"Chief Complaint   Patient presents with    Sleeping Problem    Follow-up         Subjective   Lizette Chong is a 64 y.o. female.   Patient comes back today for follow up of Obstructive Sleep apnea.     Patient says that she is compliant with her device and using it regularly.    Patient's symptoms of sleep disturbance and daytime sleepiness have been helped greatly with the use of PAP device, as prescribed.  She feels more rested with CPAP machine.     She c/o occasionally shortness of breath. She feels more like she cannot take a deep breath.     She has chronic back pain and she has pain in her mid back when she takes a deep breath so she does not take deep breaths. The pain occurs across her bra line in the back.    She used to be prescribed inhalers but stopped because she did not think she needed then.     She has never smoked.       The following portions of the patient's history were reviewed and updated as appropriate: allergies, current medications, past family history, past medical history, past social history, and past surgical history.    Review of Systems   HENT:  Negative for sinus pressure, sneezing and sore throat.    Respiratory:  Negative for cough, chest tightness, shortness of breath and wheezing.        Objective   Visit Vitals  /78   Pulse 72   Resp 16   Ht 147.3 cm (58\") Comment: pt reported   Wt 78.5 kg (173 lb)   SpO2 98%   BMI 36.16 kg/m²       Physical Exam  Vitals reviewed.   Constitutional:       Appearance: She is well-developed.   HENT:      Head: Atraumatic.      Mouth/Throat:      Mouth: Mucous membranes are moist.      Comments: Crowded oropharynx.   Eyes:      Extraocular Movements: Extraocular movements intact.   Cardiovascular:      Rate and Rhythm: Normal rate and regular rhythm.   Pulmonary:      Effort: Pulmonary effort is normal. No respiratory distress.      Breath sounds: Normal breath sounds.   Abdominal:      Comments: Obese abdomen.    Skin:     General: Skin " "is warm.   Neurological:      Mental Status: She is alert and oriented to person, place, and time.         Assessment & Plan   Diagnoses and all orders for this visit:    1. Obstructive sleep apnea (Primary)    2. Abnormal chest x-ray  -     CT Chest Without Contrast Diagnostic; Future    3. SOB (shortness of breath)  -     Complete PFT - Pre & Post Bronchodilator; Future    4. Obesity (BMI 30-39.9)           Return in about 4 months (around 8/25/2025) for Recheck, For Me, PFT, Imaging studies.    DISCUSSION (if any):  Sleep study performed in 2017  AHI was 7.6 / hour.   REM AHI was 32/hour.      Latest PAP device provided in Nov 2021  DME company: [employer]     Current PAP settings: 8-16  Current mask type: FFM    Continue treatment with AutoPAP at a pressure of 8-16, with a full-face mask.    I will ask staff to request from DME.     Humidification setup, hose and mask care discussed.    Weight loss advised. She states she has lost about 50 lbs in the last year. She is on Mounjaro.    Use every night for at least 4 hours stressed.     I have ordered a CT chest for better evaluation.     PFT ordered for f/u visit.     CXR reviewed from 3/6/2025. Chronic atelectasis or fibrosis?    X-ray chest PA and lateral  Order: [id number]  Impression    Lingular chronic discoid atelectasis or fibrosis. These are  stable from the prior study. There is no acute process.        Images reviewed, interpreted, dictated and electronically signed by Erick Hatfield MD      Voice transcription technology (Power Scribe) is used for the dictation  of this note and \"sound-alike\" words might be erroneously placed despite  reviewing this note for accuracy. Errors in dictation may reflect use of  voice recognition software and not all errors in transcription may have  been detected prior to signing.      Dictated utilizing Dragon dictation.    This document was electronically signed by MARCIN Woods April 25, 2025  11:45 EDT   "

## 2025-05-08 DIAGNOSIS — R93.89 ABNORMAL CHEST X-RAY: ICD-10-CM

## 2025-05-08 PROBLEM — R06.09 DYSPNEA ON EXERTION: Status: ACTIVE | Noted: 2025-05-08

## 2025-05-08 NOTE — ASSESSMENT & PLAN NOTE
Continue lisinopril 40 mg once a day and metoprolol tartrate 50 mg twice a day      Orders:    CBC & Differential; Future    Lipid Panel; Future    Basic Metabolic Panel; Future    Duplex Carotid Ultrasound CAR; Future

## 2025-05-08 NOTE — PROGRESS NOTES
Cardiology Follow-Up Note     Name: Lizette Chong  :   1960  PCP: Agnes Contreras, APRN  Date:   2025  Department: [facility] [place] [facility] CARDIOLOGY  [address]  [facility] [id number]  Fax [phone]  Phone [phone]    Chief Complaint   Patient presents with    Hyperlipidemia    Hypertension     Problem list  COPD  NELSON  2025 cath revealed normal coronaries normal LV function.  Cardiac Catheterization/Vascular Study (2025 16:00)   Obstructive sleep apnea  Hypertension  Hyperlipidemia  Diabetes mellitus type 2    Subjective     History of Present Illness  Lizette hCong is a 64 y.o. female who presents today for follow-up post heart cath. Doing ok, still have chest pressure lasting 30 min, not change with exertion but when gets anxious or in an argument with family.    Past Medical History:   Diagnosis Date    Anemia 2019    Anxiety     Arthritis     Asthma, extrinsic 1998    It gets worse during over exercise and if i have a cold.    B12 deficiency     Bronchiectasis     I had a bad case of bronchitis before covid-19 started    Chronic bronchitis     COPD (chronic obstructive pulmonary disease)     Coronary artery disease     I dont know for sure if i have it my records say i do    CTS (carpal tunnel syndrome)     Diabetes mellitus About     Difficulty walking About 5 years ago    I wobble and my feet hurt all the time    Disease of thyroid gland     GERD (gastroesophageal reflux disease)     Headache, tension-type     Hiatal hernia     HL (hearing loss)     Hypertension     Migraine Age 19    Neuropathy     BOTH FEET    Neuropathy in diabetes 2014    Pneumonia 2018?    I think i had it then    Pre-diabetes     Primary central sleep apnea Dont remember    I dont know what kind i have    RLS (restless legs syndrome)     Sleep apnea with use of continuous positive airway pressure (CPAP)     Sleep apnea,  obstructive Dont know    Wears glasses       Past Surgical History:   Procedure Laterality Date    BREAST BIOPSY Bilateral     Pt states needle biopsies bilaterally - unsure of dates    CARDIAC CATHETERIZATION  2017and April 16th 2025    I have one this Wednesday April 16, 2025    CARDIAC CATHETERIZATION N/A 4/16/2025    Procedure: Left Heart Cath - Right radial access;  Surgeon: Ronald Estrada MD;  Location:  [facility];  Service: Cardiovascular;  Laterality: N/A;  Right wrist    CHOLECYSTECTOMY      COLONOSCOPY  2012    ELBOW ARTHROSCOPY Right     ENDOSCOPY  2012    HYSTERECTOMY  2001    Total    KNEE ARTHROSCOPY Left 09/15/2017    Procedure: KNEE ARTHROSCOPY LEFT WITH PARTIAL MEDIAL MENISECTOMY;  Surgeon: Mike Beasley MD;  Location: [facility];  Service:     KNEE SURGERY Left     PINS PLACED TO STRAIGHTEN PATELLA    ROTATOR CUFF REPAIR Right        Current Outpatient Medications:     aspirin 81 MG EC tablet, Take 1 tablet by mouth Daily., Disp: , Rfl:     Continuous Glucose  (FreeStyle Antonio 2 [place]) device, See Admin Instructions., Disp: , Rfl:     Continuous Glucose Sensor (FreeStyle Antonio 2 Sensor) misc, CHANGE EVERY 14 DAYS., Disp: , Rfl:     glipizide (GLUCOTROL XL) 10 MG 24 hr tablet, Take 1 tablet by mouth Daily., Disp: , Rfl:     Insulin Glargine (Lantus SoloStar) 100 UNIT/ML injection pen, INJECT 40 UNITS SUBCUTANEOUSLY ONCE DAILY IN THE EVENING, Disp: , Rfl:     isosorbide mononitrate (IMDUR) 30 MG 24 hr tablet, Take 1 tablet by mouth Daily., Disp: , Rfl:     lisinopril (PRINIVIL,ZESTRIL) 40 MG tablet, Take 1 tablet by mouth Daily., Disp: , Rfl:     meclizine (ANTIVERT) 25 MG tablet, Take 2 tablets by mouth 3 (Three) Times a Day As Needed for Dizziness or Nausea., Disp: 30 tablet, Rfl: 0    metoprolol tartrate (LOPRESSOR) 50 MG tablet, Take 1 tablet by mouth 2 (Two) Times a Day. 1.5 bid, Disp: , Rfl:     Mounjaro 7.5 MG/0.5ML solution auto-injector, INJECT 7.5 MG  "SUBCUTANEOUSLY ONCE A WEEK, Disp: , Rfl:     ondansetron ODT (ZOFRAN-ODT) 4 MG disintegrating tablet, Place 1 tablet on the tongue Every 8 (Eight) Hours As Needed for Nausea or Vomiting., Disp: 12 tablet, Rfl: 0    pantoprazole (PROTONIX) 20 MG EC tablet, Take 1 tablet by mouth Daily. (Patient taking differently: Take 2 tablets by mouth Daily.), Disp: , Rfl:     pregabalin (LYRICA) 50 MG capsule, Take 2 capsules by mouth 3 (Three) Times a Day., Disp: , Rfl:     rosuvastatin (CRESTOR) 20 MG tablet, Take 1 tablet by mouth Daily., Disp: 90 tablet, Rfl: 1    sertraline (ZOLOFT) 50 MG tablet, Take 1 tablet by mouth Daily., Disp: , Rfl:     SITagliptin (JANUVIA) 100 MG tablet, Take 1 tablet by mouth Daily., Disp: , Rfl:     topiramate (TOPAMAX) 50 MG tablet, Take 1 tablet by mouth 2 (Two) Times a Day., Disp: 60 tablet, Rfl: 5    ubrogepant (Ubrelvy) 100 MG tablet, Take 1 tablet by mouth Daily As Needed (migraine)., Disp: 16 tablet, Rfl: 5    Objective     Vital Signs:  /81 (BP Location: Right arm, Patient Position: Sitting)   Pulse 55   Ht 147.3 cm (58\")   Wt 80 kg (176 lb 4.8 oz)   BMI 36.85 kg/m²   Estimated body mass index is 36.85 kg/m² as calculated from the following:    Height as of this encounter: 147.3 cm (58\").    Weight as of this encounter: 80 kg (176 lb 4.8 oz).               Vitals reviewed.   Constitutional:       Appearance: Normal and healthy appearance.   Eyes:      Pupils: Pupils are equal, round, and reactive to light.   Pulmonary:      Effort: Pulmonary effort is normal.   Chest:      Chest wall: Not tender to palpatation.   Cardiovascular:      PMI at left midclavicular line. Normal rate. Regular rhythm.      No gallop.       Comments: Carotid bruit on right  Pulses:     Intact distal pulses.   Edema:     Peripheral edema absent.   Skin:     General: Skin is warm.   Psychiatric:         Behavior: Behavior is cooperative.              Data Review:   Lab Results   Component Value Date    " "GLUCOSE 236 (H) 04/16/2025    BUN 14 04/16/2025    CREATININE 0.85 04/16/2025    EGFRIFNONA 65 09/01/2017    BCR 16.5 04/16/2025    K 4.4 04/16/2025    CO2 24.0 04/16/2025    CALCIUM 9.7 04/16/2025    ALBUMIN 4.2 09/23/2023    AST 12 09/23/2023    ALT 12 09/23/2023     No results found for: \"CHOL\", \"CHLPL\", \"TRIG\", \"HDL\", \"LDL\", \"LDLDIRECT\"   Lab Results   Component Value Date    WBC 7.18 04/16/2025    RBC 4.65 04/16/2025    HGB 14.0 04/16/2025    HCT 42.4 04/16/2025    MCV 91.2 04/16/2025     04/16/2025     No results found for: \"TSH\"  No results found for: \"HGBA1C\"  No results found for: \"INR\", \"PROTIME\"    Reviewed EKG from 4/16/2025 revealing sinus rhythm with old anterior wall myocardial infarction low voltage.  Reviewed her old chest x-ray report no effusion was noted DJD thoracic spine noted.  Assessment and Plan     Assessment & Plan  Dyspnea on exertion  The patient cardiac catheter with normal coronaries with normal LV function.  Further evaluation of dyspnea on exertion may be needed.  Discussed with patient in detail.  Orders:    CBC & Differential; Future    Lipid Panel; Future    Basic Metabolic Panel; Future    Duplex Carotid Ultrasound CAR; Future    Benign essential hypertension  Continue lisinopril 40 mg once a day and metoprolol tartrate 50 mg twice a day      Orders:    CBC & Differential; Future    Lipid Panel; Future    Basic Metabolic Panel; Future    Duplex Carotid Ultrasound CAR; Future    Hyperlipidemia, mixed   Lipid abnormalities are stable    Plan:  Continue same medication/s without change.      Discussed medication dosage, use, side effects, and goals of treatment in detail.    Counseled patient on lifestyle modifications to help control hyperlipidemia.   Advised patient to exercise for 150 minutes weekly. (30 minute brisk walk, 5 days a week for example)    Patient Treatment Goals:   LDL goal is less than 70    Followup in 3 months.  Continue rosuvastatin 20 mg once a " day  Orders:    CBC & Differential; Future    Lipid Panel; Future    Basic Metabolic Panel; Future    Duplex Carotid Ultrasound CAR; Future    Chest pressure  Patient knows had normal cath will try ranexa 500 mg twice a day.  Orders:    CBC & Differential; Future    Lipid Panel; Future    Basic Metabolic Panel; Future    Duplex Carotid Ultrasound CAR; Future    Right carotid bruit  Will check with carotid duplex.  Orders:    Duplex Carotid Ultrasound CAR; Future      Advised to continue current cardiac medications. Please notify of any issues. Discussed with the patient compliance with medical management and follow-up.     Follow Up  Return in about 3 months (around 8/9/2025).    Call if you have any significant symptoms or go to the [facility] Emergency room if possible.     Ronald Estrada MD, FACC,[facility]  [facility]    Part of this note may be an electronic transcription/translation of spoken language to printed text using the Dragon Dictation System.

## 2025-05-08 NOTE — ASSESSMENT & PLAN NOTE
Lipid abnormalities are stable    Plan:  Continue same medication/s without change.      Discussed medication dosage, use, side effects, and goals of treatment in detail.    Counseled patient on lifestyle modifications to help control hyperlipidemia.   Advised patient to exercise for 150 minutes weekly. (30 minute brisk walk, 5 days a week for example)    Patient Treatment Goals:   LDL goal is less than 70    Followup in 3 months.  Continue rosuvastatin 20 mg once a day  Orders:    CBC & Differential; Future    Lipid Panel; Future    Basic Metabolic Panel; Future    Duplex Carotid Ultrasound CAR; Future

## 2025-05-08 NOTE — ASSESSMENT & PLAN NOTE
The patient cardiac catheter with normal coronaries with normal LV function.  Further evaluation of dyspnea on exertion may be needed.  Discussed with patient in detail.  Orders:    CBC & Differential; Future    Lipid Panel; Future    Basic Metabolic Panel; Future    Duplex Carotid Ultrasound CAR; Future

## 2025-05-09 ENCOUNTER — PATIENT ROUNDING (BHMG ONLY) (OUTPATIENT)
Age: 65
End: 2025-05-09

## 2025-05-09 ENCOUNTER — OFFICE VISIT (OUTPATIENT)
Age: 65
End: 2025-05-09
Payer: MEDICARE

## 2025-05-09 VITALS
BODY MASS INDEX: 37.01 KG/M2 | HEART RATE: 55 BPM | HEIGHT: 58 IN | WEIGHT: 176.3 LBS | DIASTOLIC BLOOD PRESSURE: 81 MMHG | SYSTOLIC BLOOD PRESSURE: 159 MMHG

## 2025-05-09 DIAGNOSIS — R06.09 DYSPNEA ON EXERTION: Primary | ICD-10-CM

## 2025-05-09 DIAGNOSIS — R09.89 RIGHT CAROTID BRUIT: ICD-10-CM

## 2025-05-09 DIAGNOSIS — E78.2 HYPERLIPIDEMIA, MIXED: ICD-10-CM

## 2025-05-09 DIAGNOSIS — R07.89 CHEST PRESSURE: ICD-10-CM

## 2025-05-09 DIAGNOSIS — I10 BENIGN ESSENTIAL HYPERTENSION: ICD-10-CM

## 2025-05-09 RX ORDER — RANOLAZINE 500 MG/1
500 TABLET, EXTENDED RELEASE ORAL 2 TIMES DAILY
Qty: 180 TABLET | Refills: 1 | Status: SHIPPED | OUTPATIENT
Start: 2025-05-09

## 2025-05-09 NOTE — ASSESSMENT & PLAN NOTE
Patient knows had normal cath will try ranexa 500 mg twice a day.  Orders:    CBC & Differential; Future    Lipid Panel; Future    Basic Metabolic Panel; Future    Duplex Carotid Ultrasound CAR; Future

## 2025-05-09 NOTE — PROGRESS NOTES
My name is Parisa Orosco, and I am the Practice Manager for [facility].    I would like to thank you for being a loyal patient. If you do not mind, I would like to ask you some questions about your recent visit with us. Please feel free to reply if you wish to provide us with feedback on your visit with our practice.    First, could you tell me what went well with your recent visit?    Secondly, we are always looking for ways to make our patients' experiences even better. Do you have any recommendations on what we can do to improve your experience?    Finally, overall were you satisfied with your visit with us as a [facility] facility?    Over the next few days, you will be receiving a Patient Experience Survey. Please consider taking the survey, as it helps [facility] in improving their patient care.    Thank you for taking the time to answer our questions today.    I hope you have a good day.

## 2025-05-27 ENCOUNTER — TRANSCRIBE ORDERS (OUTPATIENT)
Dept: ADMINISTRATIVE | Facility: HOSPITAL | Age: 65
End: 2025-05-27
Payer: MEDICARE

## 2025-05-27 DIAGNOSIS — Z12.31 ENCOUNTER FOR SCREENING MAMMOGRAM FOR BREAST CANCER: Primary | ICD-10-CM

## 2025-06-19 ENCOUNTER — HOSPITAL ENCOUNTER (OUTPATIENT)
Facility: HOSPITAL | Age: 65
Discharge: HOME | End: 2025-06-19
Admitting: INTERNAL MEDICINE
Payer: MEDICARE

## 2025-06-19 DIAGNOSIS — R09.89 RIGHT CAROTID BRUIT: ICD-10-CM

## 2025-06-19 DIAGNOSIS — E78.2 HYPERLIPIDEMIA, MIXED: ICD-10-CM

## 2025-06-19 DIAGNOSIS — R07.89 CHEST PRESSURE: ICD-10-CM

## 2025-06-19 DIAGNOSIS — R06.09 DYSPNEA ON EXERTION: ICD-10-CM

## 2025-06-19 DIAGNOSIS — I10 BENIGN ESSENTIAL HYPERTENSION: ICD-10-CM

## 2025-06-19 LAB
BH CV XLRA MEAS LEFT DIST CCA EDV: 19.2 CM/SEC
BH CV XLRA MEAS LEFT DIST CCA PSV: 66.3 CM/SEC
BH CV XLRA MEAS LEFT DIST ICA EDV: 17.8 CM/SEC
BH CV XLRA MEAS LEFT DIST ICA PSV: 42.9 CM/SEC
BH CV XLRA MEAS LEFT ICA/CCA RATIO: 1.1
BH CV XLRA MEAS LEFT MID CCA EDV: 17.2 CM/SEC
BH CV XLRA MEAS LEFT MID CCA PSV: 65.9 CM/SEC
BH CV XLRA MEAS LEFT MID ICA EDV: 29.5 CM/SEC
BH CV XLRA MEAS LEFT MID ICA PSV: 72.7 CM/SEC
BH CV XLRA MEAS LEFT PROX CCA EDV: 19.2 CM/SEC
BH CV XLRA MEAS LEFT PROX CCA PSV: 68.8 CM/SEC
BH CV XLRA MEAS LEFT PROX ECA PSV: 76.3 CM/SEC
BH CV XLRA MEAS LEFT PROX ICA EDV: 15.7 CM/SEC
BH CV XLRA MEAS LEFT PROX ICA PSV: 50.6 CM/SEC
BH CV XLRA MEAS LEFT PROX SCLA PSV: 127 CM/SEC
BH CV XLRA MEAS LEFT VERTEBRAL A EDV: 14.9 CM/SEC
BH CV XLRA MEAS LEFT VERTEBRAL A PSV: 36.8 CM/SEC
BH CV XLRA MEAS RIGHT DIST CCA EDV: 15.4 CM/SEC
BH CV XLRA MEAS RIGHT DIST CCA PSV: 46.5 CM/SEC
BH CV XLRA MEAS RIGHT DIST ICA EDV: 30.3 CM/SEC
BH CV XLRA MEAS RIGHT DIST ICA PSV: 77.3 CM/SEC
BH CV XLRA MEAS RIGHT ICA/CCA RATIO: 1.57
BH CV XLRA MEAS RIGHT MID CCA EDV: 18.9 CM/SEC
BH CV XLRA MEAS RIGHT MID CCA PSV: 50.9 CM/SEC
BH CV XLRA MEAS RIGHT MID ICA EDV: 32.4 CM/SEC
BH CV XLRA MEAS RIGHT MID ICA PSV: 73.2 CM/SEC
BH CV XLRA MEAS RIGHT PROX CCA EDV: 14.9 CM/SEC
BH CV XLRA MEAS RIGHT PROX CCA PSV: 55.8 CM/SEC
BH CV XLRA MEAS RIGHT PROX ECA PSV: 58.1 CM/SEC
BH CV XLRA MEAS RIGHT PROX ICA EDV: 13.8 CM/SEC
BH CV XLRA MEAS RIGHT PROX ICA PSV: 35.2 CM/SEC
BH CV XLRA MEAS RIGHT PROX SCLA PSV: 116 CM/SEC
BH CV XLRA MEAS RIGHT VERTEBRAL A EDV: 8.6 CM/SEC
BH CV XLRA MEAS RIGHT VERTEBRAL A PSV: 31 CM/SEC

## 2025-06-19 PROCEDURE — 93880 EXTRACRANIAL BILAT STUDY: CPT

## 2025-07-02 LAB
QT INTERVAL: 424 MS
QTC INTERVAL: 433 MS

## 2025-07-15 ENCOUNTER — OFFICE VISIT (OUTPATIENT)
Dept: NEUROLOGY | Facility: CLINIC | Age: 65
End: 2025-07-15
Payer: MEDICARE

## 2025-07-15 VITALS
WEIGHT: 177 LBS | BODY MASS INDEX: 37.16 KG/M2 | OXYGEN SATURATION: 99 % | HEIGHT: 58 IN | TEMPERATURE: 97.8 F | SYSTOLIC BLOOD PRESSURE: 130 MMHG | DIASTOLIC BLOOD PRESSURE: 80 MMHG | HEART RATE: 60 BPM

## 2025-07-15 DIAGNOSIS — G43.009 MIGRAINE WITHOUT AURA AND WITHOUT STATUS MIGRAINOSUS, NOT INTRACTABLE: ICD-10-CM

## 2025-07-15 DIAGNOSIS — G43.909 ACUTE MIGRAINE: ICD-10-CM

## 2025-07-15 DIAGNOSIS — R51.9 PERSISTENT HEADACHES: ICD-10-CM

## 2025-07-15 RX ORDER — PANTOPRAZOLE SODIUM 40 MG/10ML
INJECTION, POWDER, LYOPHILIZED, FOR SOLUTION INTRAVENOUS
COMMUNITY
Start: 2025-07-10

## 2025-07-15 NOTE — PROGRESS NOTES
"     Follow Up Office Visit      Patient Name: Lizette Chong  : 1960   MRN: [id number]     Chief Complaint:    Chief Complaint   Patient presents with    Follow-up     Headaches; memory concerns       History of Present Illness: Lizette Chong is a 65 y.o. female who is here today to follow up with migraines. She is having 4 MD's per month  and says she will try Tylenol and if not effective will take a Ubrelvy and never has to repeat this dose. She has other HA days as well. Still having brain fog. Discussed with pt and feels Topamax has made this worse so will decrease dose and stop this.  Her migraines are located on either side of her head.  She does note that she has been under stress with some family members and this is probably also contributing to her headaches.  Migraines have associated photophobia, phonophobia and sometimes blurred vision.  She denies any changes with the since her last office visit.    -She is scheduled for allergy testing  -Wears CPAP nightly. Has PFT's scheduled in 1-2 months    -In the past she has been on Amitriptyline and did not help, Topirimate caused SE's. She is on Metoprolol       History of Present Illness carried forward from her 2024 office note as follows: Lizette Chong is a 64 y.o. female who is here today to establish care with Neurology.  She reports daily headaches to the left side of the head \"pressure\" and says at times  it will worsen to where she has blurred  vision, with photophobia and phonophobia and become a migraine. Reports she is having 3 MD's per month. She says the Topiramate was helping with these but she has been out since April. Reports she was at that time seeing Neuro at  [facility].   She has had headaches since she was 19 and says she had migraines as well.   She is under a lot of stress  with her mom who has dementia and she is caring for teenagers and her 35 yr old nephew lives with her and she is caring for all of them. " She says this has been playing a toll on her and she  has been having some memory issues with this.    -CT Head WO on 9/23/2023  FINDINGS:There is no evidence of acute hemorrhage. No evidence of mass  effect or midline shift. There is no evidence of displaced fracture.  Mild cerebral atrophy noted.. Paranasal sinuses and mastoid air cells  are clear. Consider further assessment with MRI if clinically warranted.     IMPRESSION:  No evidence of acute hemorrhage, mass effect, or midline  shift.      Pertinent Medical History: KEVIN-wears CPAP religiously ( Followed by Dr Rosales), DM, diabetic peripheral neuropathy, RLS, Vit B and Vit D deficiency           Subjective      Review of Systems:   Review of Systems   Eyes:  Positive for blurred vision and photophobia.   Neurological:  Positive for headache.       I have reviewed and the following portions of the patient's history were updated as appropriate: past family history, past medical history, past social history, past surgical history and problem list.    Medications:     Current Outpatient Medications:     aspirin 81 MG EC tablet, Take 1 tablet by mouth Daily., Disp: , Rfl:     Continuous Glucose  (FreeStyle Antonio 2 [place]) device, See Admin Instructions., Disp: , Rfl:     Continuous Glucose Sensor (FreeStyle Antonio 2 Sensor) [facility], CHANGE EVERY 14 DAYS., Disp: , Rfl:     glipizide (GLUCOTROL XL) 10 MG 24 hr tablet, Take 1 tablet by mouth Daily., Disp: , Rfl:     Insulin Glargine (Lantus SoloStar) 100 UNIT/ML injection pen, INJECT 40 UNITS SUBCUTANEOUSLY ONCE DAILY IN THE EVENING, Disp: , Rfl:     isosorbide mononitrate (IMDUR) 30 MG 24 hr tablet, Take 1 tablet by mouth Daily., Disp: , Rfl:     lisinopril (PRINIVIL,ZESTRIL) 40 MG tablet, Take 1 tablet by mouth Daily., Disp: , Rfl:     meclizine (ANTIVERT) 25 MG tablet, Take 2 tablets by mouth 3 (Three) Times a Day As Needed for Dizziness or Nausea., Disp: 30 tablet, Rfl: 0    metoprolol tartrate (LOPRESSOR) 50  "MG tablet, Take 1 tablet by mouth 2 (Two) Times a Day. 1.5 bid, Disp: , Rfl:     Mounjaro 7.5 MG/0.5ML solution auto-injector, INJECT 7.5 MG SUBCUTANEOUSLY ONCE A WEEK, Disp: , Rfl:     ondansetron ODT (ZOFRAN-ODT) 4 MG disintegrating tablet, Place 1 tablet on the tongue Every 8 (Eight) Hours As Needed for Nausea or Vomiting., Disp: 12 tablet, Rfl: 0    pantoprazole (PROTONIX) 40 MG injection, , Disp: , Rfl:     pregabalin (LYRICA) 50 MG capsule, Take 2 capsules by mouth 3 (Three) Times a Day., Disp: , Rfl:     ranolazine (Ranexa) 500 MG 12 hr tablet, Take 1 tablet by mouth 2 (Two) Times a Day., Disp: 180 tablet, Rfl: 1    rosuvastatin (CRESTOR) 20 MG tablet, Take 1 tablet by mouth Daily., Disp: 90 tablet, Rfl: 1    sertraline (ZOLOFT) 50 MG tablet, Take 1 tablet by mouth Daily., Disp: , Rfl:     SITagliptin (JANUVIA) 100 MG tablet, Take 1 tablet by mouth Daily., Disp: , Rfl:     topiramate (TOPAMAX) 50 MG tablet, Take 1 tablet by mouth 2 (Two) Times a Day., Disp: 60 tablet, Rfl: 5    ubrogepant (Ubrelvy) 100 MG tablet, Take 1 tablet by mouth Daily As Needed (migraine)., Disp: 16 tablet, Rfl: 4    Allergies:   Allergies   Allergen Reactions    Nabumetone Hives    Phenobarbital-Belladonna Alk Other (See Comments)     HEADACHES PT NOT SURE         Objective     Physical Exam:  Vital Signs:   Vitals:    07/15/25 0826   BP: 130/80   Pulse: 60   Temp: 97.8 °F (36.6 °C)   SpO2: 99%   Weight: 80.3 kg (177 lb)   Height: 147.3 cm (58\")   PainSc: 0-No pain     Body mass index is 36.99 kg/m².    Physical Exam  Constitutional:       Appearance: Normal appearance.   HENT:      Head: Normocephalic.   Eyes:      Conjunctiva/sclera: Conjunctivae normal.   Pulmonary:      Effort: Pulmonary effort is normal.   Musculoskeletal:         General: Normal range of motion.   Skin:     General: Skin is warm and dry.   Neurological:      General: No focal deficit present.      Mental Status: She is alert and oriented to person, place, and " time.      Cranial Nerves: Cranial nerves 2-12 are intact.      Motor: Motor function is intact.      Gait: Gait is intact.   Psychiatric:         Attention and Perception: Attention normal.         Mood and Affect: Mood and affect normal.         Speech: Speech normal.         Behavior: Behavior normal. Behavior is cooperative.         Thought Content: Thought content normal.         Cognition and Memory: Cognition normal.         Judgment: Judgment normal.         Neurological Exam  Mental Status  Alert. Oriented to person, place, time and situation. Oriented to person, place, and time. Speech is normal. Speech: Edentulous speech.    Gait  Casual gait is normal including stance, stride, and arm swing. Normal gait.      Assessment / Plan      Assessment/Plan:   Diagnoses and all orders for this visit:    1. Migraine without aura and without status migrainosus, not intractable    2. Persistent headaches    3. Acute migraine  -     ubrogepant (Ubrelvy) 100 MG tablet; Take 1 tablet by mouth Daily As Needed (migraine).  Dispense: 16 tablet; Refill: 4           Is a pleasant 65-year-old female patient here to follow-up with daily headaches and migraines.  She is having about 4 migraine days per month.  Ubrelvy aborts this on the first dose.  We will decrease her topiramate as she has been also having some memory complaints and brain fog which could be related to this as she does states that it did worsen after being on the topiramate.  She will decrease down to 1 tablet at night x 2 weeks then half a tablet at night x 1 week and then stop.  Indications and side effects discussed with patient she verbalizes understanding.  Patient will call in the interim if she has any questions or concerns or changes.    Follow Up:   Return in about 4 months (around 11/15/2025).    MARCIN Davis, FNP-[facility]

## 2025-08-11 ENCOUNTER — OFFICE VISIT (OUTPATIENT)
Age: 65
End: 2025-08-11
Payer: MEDICARE

## 2025-08-11 ENCOUNTER — PATIENT ROUNDING (BHMG ONLY) (OUTPATIENT)
Age: 65
End: 2025-08-11

## 2025-08-11 VITALS
BODY MASS INDEX: 37.79 KG/M2 | SYSTOLIC BLOOD PRESSURE: 171 MMHG | DIASTOLIC BLOOD PRESSURE: 94 MMHG | HEART RATE: 86 BPM | WEIGHT: 180 LBS | HEIGHT: 58 IN

## 2025-08-11 DIAGNOSIS — E11.65 TYPE 2 DIABETES MELLITUS WITH HYPERGLYCEMIA, WITHOUT LONG-TERM CURRENT USE OF INSULIN: ICD-10-CM

## 2025-08-11 DIAGNOSIS — E78.5 HYPERLIPIDEMIA LDL GOAL <70: ICD-10-CM

## 2025-08-11 DIAGNOSIS — G47.33 OBSTRUCTIVE SLEEP APNEA: ICD-10-CM

## 2025-08-11 DIAGNOSIS — I10 HYPERTENSION, ESSENTIAL: Primary | ICD-10-CM

## 2025-08-11 DIAGNOSIS — R07.9 CHEST PAIN, UNSPECIFIED TYPE: ICD-10-CM

## 2025-08-11 PROCEDURE — 3077F SYST BP >= 140 MM HG: CPT

## 2025-08-11 PROCEDURE — 1160F RVW MEDS BY RX/DR IN RCRD: CPT

## 2025-08-11 PROCEDURE — 3080F DIAST BP >= 90 MM HG: CPT

## 2025-08-11 PROCEDURE — 1159F MED LIST DOCD IN RCRD: CPT

## 2025-08-11 PROCEDURE — 99214 OFFICE O/P EST MOD 30 MIN: CPT

## 2025-08-11 RX ORDER — DESLORATADINE 5 MG/1
1 TABLET ORAL DAILY
COMMUNITY
Start: 2025-07-16

## 2025-08-11 RX ORDER — VALSARTAN 160 MG/1
160 TABLET ORAL DAILY
Qty: 90 TABLET | Refills: 0 | Status: SHIPPED | OUTPATIENT
Start: 2025-08-11

## 2025-08-11 RX ORDER — MONTELUKAST SODIUM 10 MG/1
TABLET ORAL NIGHTLY
COMMUNITY
Start: 2025-07-21

## 2025-08-25 ENCOUNTER — OFFICE VISIT (OUTPATIENT)
Dept: PULMONOLOGY | Facility: CLINIC | Age: 65
End: 2025-08-25
Payer: MEDICARE

## 2025-08-25 VITALS
BODY MASS INDEX: 37.76 KG/M2 | HEART RATE: 88 BPM | OXYGEN SATURATION: 100 % | HEIGHT: 58 IN | SYSTOLIC BLOOD PRESSURE: 152 MMHG | DIASTOLIC BLOOD PRESSURE: 82 MMHG | RESPIRATION RATE: 18 BRPM | WEIGHT: 179.9 LBS

## 2025-08-25 DIAGNOSIS — R06.02 SOB (SHORTNESS OF BREATH): ICD-10-CM

## 2025-08-25 DIAGNOSIS — J45.30 MILD PERSISTENT ASTHMA, UNSPECIFIED WHETHER COMPLICATED: ICD-10-CM

## 2025-08-25 DIAGNOSIS — E66.9 OBESITY (BMI 30-39.9): ICD-10-CM

## 2025-08-25 DIAGNOSIS — G47.33 OBSTRUCTIVE SLEEP APNEA: Primary | ICD-10-CM

## 2025-08-25 PROCEDURE — 3079F DIAST BP 80-89 MM HG: CPT | Performed by: NURSE PRACTITIONER

## 2025-08-25 PROCEDURE — 94726 PLETHYSMOGRAPHY LUNG VOLUMES: CPT | Performed by: INTERNAL MEDICINE

## 2025-08-25 PROCEDURE — 94060 EVALUATION OF WHEEZING: CPT | Performed by: INTERNAL MEDICINE

## 2025-08-25 PROCEDURE — 3077F SYST BP >= 140 MM HG: CPT | Performed by: NURSE PRACTITIONER

## 2025-08-25 PROCEDURE — 94729 DIFFUSING CAPACITY: CPT | Performed by: INTERNAL MEDICINE

## 2025-08-25 PROCEDURE — 99214 OFFICE O/P EST MOD 30 MIN: CPT | Performed by: NURSE PRACTITIONER

## 2025-08-25 RX ORDER — ALBUTEROL SULFATE 90 UG/1
2 INHALANT RESPIRATORY (INHALATION) EVERY 6 HOURS PRN
COMMUNITY

## (undated) DEVICE — SOL IRR NACL 0.9PCT 3000ML

## (undated) DEVICE — OCCLUSIVE GAUZE PATCH,3% BISMUTH TRIBROMOPHENATE IN PETROLATUM BLEND: Brand: XEROFORM

## (undated) DEVICE — BNDG ELAS MATRX V/CLS 6IN 5YD LF

## (undated) DEVICE — BNDG ELAS ELITE V/CLOSE 6IN 5YD LF STRL

## (undated) DEVICE — GLIDESHEATH SLENDER ACCESS KIT: Brand: GLIDESHEATH SLENDER

## (undated) DEVICE — DYONICS 25 PATIENT TUBE SET MUST                                    BE USED WITH 7211007, 12 PER BOX

## (undated) DEVICE — CUFF SCD HEMOFORCE SEQ CALF STD MD

## (undated) DEVICE — TR BAND RADIAL ARTERY COMPRESSION DEVICE: Brand: TR BAND

## (undated) DEVICE — PK KN ARTHSCP 20

## (undated) DEVICE — BNDG ELAS MATRX V/CLS 4IN 5YD LF

## (undated) DEVICE — ST INF PRI SMRTSTE 20DRP 2VLV 24ML 117

## (undated) DEVICE — EMERALD ARTHROSCOPY SHEET: Brand: CONVERTORS

## (undated) DEVICE — CAST PADDING 6 IN KIT: Brand: CARDINAL HEALTH

## (undated) DEVICE — GOWN,SIRUS,NON REINFRCD,LARGE,SET IN SL: Brand: MEDLINE

## (undated) DEVICE — GW PERIPH GUIDERIGHT STD/EXCHNG/J/TIP SS 0.035IN 5X260CM

## (undated) DEVICE — 4.5 MM FULL RADIUS STRAIGHT                                    BLADES, POWER/EP-1, YELLOW, PACKAGED                                    6 PER BOX, STERILE: Brand: DYONICS

## (undated) DEVICE — CATH DIAG EXPO .045 PIG 5F 110CM

## (undated) DEVICE — RADIFOCUS GLIDEWIRE: Brand: GLIDEWIRE

## (undated) DEVICE — PK CATH CARD 10

## (undated) DEVICE — OCCLUSIVE GAUZE STRIP,3% BISMUTH TRIBROMOPHENATE IN PETROLATUM BLEND: Brand: XEROFORM

## (undated) DEVICE — DISPOSABLE TOURNIQUET CUFF SINGLE BLADDER, SINGLE PORT AND QUICK CONNECT CONNECTOR: Brand: COLOR CUFF

## (undated) DEVICE — BNDG ELAS ELITE V/CLOSE 4IN 5YD LF STRL

## (undated) DEVICE — BANDAGE,GAUZE,CONFORMING,6"X80",STRL,LF: Brand: MEDLINE

## (undated) DEVICE — GLV SURG SENSICARE GREEN W/ALOE PF LF 8 STRL

## (undated) DEVICE — LHK- LEFT HEART KIT BAPTIST: Brand: MEDLINE INDUSTRIES, INC.

## (undated) DEVICE — ADULT, W/LG. BACK PAD, RADIOTRANSPARENT ELEMENT AND LEAD WIRE COMPATIBLE W/: Brand: DEFIBRILLATION ELECTRODES

## (undated) DEVICE — SUT MNCRYL PLS ANTIB UD 4/0 PS2 18IN

## (undated) DEVICE — BNDG ESMARK 6INX9FT STRL

## (undated) DEVICE — SPNG GZ WOVN 4X4IN 12PLY 10/BX STRL

## (undated) DEVICE — RADIFOCUS OPTITORQUE ANGIOGRAPHIC CATHETER: Brand: OPTITORQUE

## (undated) DEVICE — GLV SURG SENSICARE W/ALOE PF LF 7.5 STRL